# Patient Record
Sex: FEMALE | Race: WHITE | Employment: UNEMPLOYED | URBAN - METROPOLITAN AREA
[De-identification: names, ages, dates, MRNs, and addresses within clinical notes are randomized per-mention and may not be internally consistent; named-entity substitution may affect disease eponyms.]

---

## 2017-06-04 ENCOUNTER — HOSPITAL ENCOUNTER (EMERGENCY)
Age: 42
Discharge: STILL ADMITTED OR ADMINISTRATIVE | End: 2017-06-04
Attending: FAMILY MEDICINE

## 2018-02-24 ENCOUNTER — HOSPITAL ENCOUNTER (EMERGENCY)
Age: 43
Discharge: HOME | End: 2018-02-24
Attending: EMERGENCY MEDICINE | Admitting: EMERGENCY MEDICINE
Payer: SELF-PAY

## 2018-02-24 ENCOUNTER — APPOINTMENT (OUTPATIENT)
Dept: CT IMAGING | Age: 43
End: 2018-02-24
Attending: EMERGENCY MEDICINE
Payer: SELF-PAY

## 2018-02-24 VITALS
OXYGEN SATURATION: 96 % | SYSTOLIC BLOOD PRESSURE: 102 MMHG | WEIGHT: 110 LBS | HEIGHT: 65 IN | RESPIRATION RATE: 16 BRPM | BODY MASS INDEX: 18.33 KG/M2 | TEMPERATURE: 98.8 F | HEART RATE: 75 BPM | DIASTOLIC BLOOD PRESSURE: 61 MMHG

## 2018-02-24 DIAGNOSIS — R51.9 NONINTRACTABLE EPISODIC HEADACHE, UNSPECIFIED HEADACHE TYPE: Primary | ICD-10-CM

## 2018-02-24 PROCEDURE — 96374 THER/PROPH/DIAG INJ IV PUSH: CPT

## 2018-02-24 PROCEDURE — 70450 CT HEAD/BRAIN W/O DYE: CPT

## 2018-02-24 PROCEDURE — 74011250637 HC RX REV CODE- 250/637: Performed by: EMERGENCY MEDICINE

## 2018-02-24 PROCEDURE — 96361 HYDRATE IV INFUSION ADD-ON: CPT

## 2018-02-24 PROCEDURE — 99283 EMERGENCY DEPT VISIT LOW MDM: CPT

## 2018-02-24 PROCEDURE — 74011250636 HC RX REV CODE- 250/636: Performed by: EMERGENCY MEDICINE

## 2018-02-24 PROCEDURE — 96375 TX/PRO/DX INJ NEW DRUG ADDON: CPT

## 2018-02-24 RX ORDER — BUTALBITAL, ACETAMINOPHEN AND CAFFEINE 50; 325; 40 MG/1; MG/1; MG/1
1 TABLET ORAL
Status: COMPLETED | OUTPATIENT
Start: 2018-02-24 | End: 2018-02-24

## 2018-02-24 RX ORDER — BUTALBITAL, ACETAMINOPHEN AND CAFFEINE 300; 40; 50 MG/1; MG/1; MG/1
1 CAPSULE ORAL
Qty: 15 CAP | Refills: 0 | Status: SHIPPED | OUTPATIENT
Start: 2018-02-24 | End: 2018-07-17

## 2018-02-24 RX ORDER — SODIUM CHLORIDE 9 MG/ML
1000 INJECTION, SOLUTION INTRAVENOUS ONCE
Status: COMPLETED | OUTPATIENT
Start: 2018-02-24 | End: 2018-02-24

## 2018-02-24 RX ORDER — METOCLOPRAMIDE HYDROCHLORIDE 5 MG/ML
10 INJECTION INTRAMUSCULAR; INTRAVENOUS
Status: COMPLETED | OUTPATIENT
Start: 2018-02-24 | End: 2018-02-24

## 2018-02-24 RX ORDER — DIPHENHYDRAMINE HYDROCHLORIDE 50 MG/ML
25 INJECTION, SOLUTION INTRAMUSCULAR; INTRAVENOUS
Status: COMPLETED | OUTPATIENT
Start: 2018-02-24 | End: 2018-02-24

## 2018-02-24 RX ORDER — DIPHENHYDRAMINE HYDROCHLORIDE 50 MG/ML
25 INJECTION, SOLUTION INTRAMUSCULAR; INTRAVENOUS
Status: DISCONTINUED | OUTPATIENT
Start: 2018-02-24 | End: 2018-02-24

## 2018-02-24 RX ADMIN — BUTALBITAL, ACETAMINOPHEN AND CAFFEINE 1 TABLET: 50; 325; 40 TABLET ORAL at 11:33

## 2018-02-24 RX ADMIN — SODIUM CHLORIDE 1000 ML: 900 INJECTION, SOLUTION INTRAVENOUS at 09:55

## 2018-02-24 RX ADMIN — METOCLOPRAMIDE 10 MG: 5 INJECTION, SOLUTION INTRAMUSCULAR; INTRAVENOUS at 09:53

## 2018-02-24 RX ADMIN — DIPHENHYDRAMINE HYDROCHLORIDE 25 MG: 50 INJECTION, SOLUTION INTRAMUSCULAR; INTRAVENOUS at 10:04

## 2018-02-24 NOTE — ED PROVIDER NOTES
EMERGENCY DEPARTMENT HISTORY AND PHYSICAL EXAM      Date: 2/24/2018  Patient Name: Ketty Whitfield    History of Presenting Illness     Chief Complaint   Patient presents with    Headache     since thursday; localized to anterior/right/posterior head. pt had a gum infection 2 weeks ago on the right side which morphed into this headache    Nausea    Vomiting     one episode today       History Provided By: Patient    HPI: Ketty Whitfield, 43 y.o. female with PMHx significant for HA, depression, and anxiety, presents herself w/ daughter to the ED with cc of progressively worsening HA for the last 3 days. Pt reports associated photophobia, nausea, and decreased appetite since onset of HA. She notes that she has been experiencing this HA soon after her toothache has steadily decreased. She reports exacerbation of HA w/ light and talking. She notes slight improvement of HA w/ lying down. Pt states she took a variety of OTC medicine (Motrin, Tylenol, Advil) at home for improvement of symptoms but found no such relief. She reports of experiencing similar symptoms prior to arrival to ED. Pt additionally reports that her menstrual cycles are irregular as she had a tubal ligation 10 years ago. She notes of a hx of ovarian cysts. Pt denies any recent long distance travel. Pt denies any recent illness. Pt specifically denies vomiting. PCP: None    There are no other complaints, changes, or physical findings at this time.     Past History     Past Medical History:  Past Medical History:   Diagnosis Date    Anxiety     Depression     Gyn exam     Dr Katie Lr Headache(784.0)     Panic attack     Postpartum hemorrhage 1/2010    Psychiatric disorder     depression    Sleep trouble     Tobacco use disorder        Past Surgical History:  Past Surgical History:   Procedure Laterality Date    DILATION AND CURETTAGE  2003, 1/2010    HX GYN      D+C x 2    HX HEENT      tonsillectomy    HX PACEMAKER  09/2013    HX TUBAL LIGATION  4-2010       Family History:  Family History   Problem Relation Age of Onset    Cancer Maternal Grandmother     Diabetes Maternal Grandmother     Heart Disease Maternal Grandmother     Hypertension Maternal Grandmother     Hypertension Mother        Social History:  Social History   Substance Use Topics    Smoking status: Current Every Day Smoker     Packs/day: 0.50     Years: 10.00     Types: Cigarettes    Smokeless tobacco: Never Used    Alcohol use No      Comment: patient states she quit       Allergies:  No Known Allergies      Review of Systems   Review of Systems   Constitutional: Positive for appetite change (decreased). Negative for chills and fever. HENT: Negative for congestion. Eyes: Positive for photophobia. Negative for visual disturbance. Respiratory: Negative for chest tightness. Cardiovascular: Negative for chest pain and leg swelling. Gastrointestinal: Positive for nausea. Negative for abdominal pain and vomiting. Endocrine: Negative for polyuria. Genitourinary: Negative for dysuria and frequency. Musculoskeletal: Negative for myalgias. Skin: Negative for color change. Allergic/Immunologic: Negative for immunocompromised state. Neurological: Positive for headaches. Negative for numbness. Physical Exam   Physical Exam    Nursing note and vitals reviewed. General appearance: non-toxic,   Eyes: PERRL, EOMI, conjunctiva normal, anicteric sclera  HEENT: mucous membranes tacky, oropharynx is clear, neck is supple, upper and lower dentures in place. Tongue piercing noted. No gingival swelling, drainage, or signs of ongoing infection.    Pulmonary: clear to auscultation bilaterally  Cardiac: normal rate and regular rhythm, no murmurs, gallops, or rubs, 2+ peripheral pulses, no carotid bruits, cap refill < 2 seconds  Abdomen: soft, nontender, nondistended, bowel sounds present  MSK: no lower extremity edema  Neuro: Alert, answers questions appropriately, 5/5 strength in all 4 extremities, VFF, finger to nose normal, light touch intact in all four extremities, cranial nerves II-XII intact    Diagnostic Study Results     Radiologic Studies -   CT Results  (Last 48 hours)               02/24/18 1027  CT HEAD WO CONT Final result    Impression:  IMPRESSION:    1. No evidence of acute intracranial abnormality by this modality. Narrative:  EXAM:  CT HEAD WO CONT   INDICATION:   Headache   Additional history: Localize headache and anterior, right and posterior head. Previous dental/oral infection the right side previously which the patient   believes is related. COMPARISON: None. .   TECHNIQUE:    Unenhanced CT of the head was performed using 5 mm images. Coronal and sagittal   reformats were produced. Brain and bone windows were generated. CT dose reduction was achieved through use of a standardized protocol tailored   for this examination and automatic exposure control for dose modulation. Nancy Contreras FINDINGS:   The ventricles and sulci are normal in size, shape and configuration and   midline. There is no significant white matter disease. There is no intracranial   hemorrhage, extra-axial collection, mass, mass effect or midline shift. The   basilar cisterns are open. No acute infarct is identified. The bone windows   demonstrate no abnormalities. The visualized portions of the paranasal sinuses   and mastoid air cells are clear. .           Medical Decision Making   I am the first provider for this patient. I reviewed the vital signs, available nursing notes, past medical history, past surgical history, family history and social history. Vital Signs-Reviewed the patient's vital signs.   Patient Vitals for the past 12 hrs:   Temp Pulse Resp BP SpO2   02/24/18 0901 98.8 °F (37.1 °C) 75 16 102/61 96 %     Records Reviewed: Nursing Notes and Old Medical Records    Provider Notes (Medical Decision Making):   DDx: migraine, tension HA, hormone related HA, cephalgia, low suspicion for [facility] or cns infection. ED Course:   Initial assessment performed. The patients presenting problems have been discussed, and they are in agreement with the care plan formulated and outlined with them. I have encouraged them to ask questions as they arise throughout their visit. PROGRESS NOTE:   10:50 AM  Pt has been re-examined by Adeline Mooney MD. Pt states she feels better after receiving medicine. 11:10 AM  Pt has been updated on available imaging results by Adeline Mooney MD. Discussed plan of care and possible discharge. Pt agrees to plan of care and is ready to be discharged. Discharge Note:  11:41 AM  The pt is ready for discharge. The pt's signs, symptoms, diagnosis, and discharge instructions have been discussed and pt has conveyed their understanding. The pt is to follow up as recommended or return to ER should their symptoms worsen. Plan has been discussed and pt is in agreement. PLAN:  1. Current Discharge Medication List      START taking these medications    Details   butalbital-acetaminophen-caff (FIORICET) -40 mg per capsule Take 1 Cap by mouth every six (6) hours as needed for Pain or Headache. Indications: TENSION-TYPE HEADACHE, DO NOT COMBINE WITH OTHER TYLENOL CONTAINING PRODUCTS  Qty: 15 Cap, Refills: 0           2. Follow-up Information     Follow up With Details Comments Contact Info    PRIMARY CARE DOCTOR Schedule an appointment as soon as possible for a visit in 1 week  SEE ATTACHED LIST    [facility] EMERGENCY DEPT Go in 1 day If symptoms worsen [address] Ayaan Michel MD Schedule an appointment as soon as possible for a visit in 1 week [address]  [facility] [address] [place] [address]  [phone]          Return to ED if worse     Diagnosis     Clinical Impression:   1.  Nonintractable episodic headache, unspecified headache type        Attestations: This note is prepared by [employer], acting as Scribe for MD Forrest Ely MD: The scribe's documentation has been prepared under my direction and personally reviewed by me in its entirety. I confirm that the note above accurately reflects all work, treatment, procedures, and medical decision making performed by me.

## 2018-02-24 NOTE — DISCHARGE INSTRUCTIONS
Headache: Care Instructions  Your Care Instructions    Headaches have many possible causes. Most headaches aren't a sign of a more serious problem, and they will get better on their own. Home treatment may help you feel better faster. The doctor has checked you carefully, but problems can develop later. If you notice any problems or new symptoms, get medical treatment right away. Follow-up care is a key part of your treatment and safety. Be sure to make and go to all appointments, and call your doctor if you are having problems. It's also a good idea to know your test results and keep a list of the medicines you take. How can you care for yourself at home? · Do not drive if you have taken a prescription pain medicine. · Rest in a quiet, dark room until your headache is gone. Close your eyes and try to relax or go to sleep. Don't watch TV or read. · Put a cold, moist cloth or cold pack on the painful area for 10 to 20 minutes at a time. Put a thin cloth between the cold pack and your skin. · Use a warm, moist towel or a heating pad set on low to relax tight shoulder and neck muscles. · Have someone gently massage your neck and shoulders. · Take pain medicines exactly as directed. ¨ If the doctor gave you a prescription medicine for pain, take it as prescribed. ¨ If you are not taking a prescription pain medicine, ask your doctor if you can take an over-the-counter medicine. · Be careful not to take pain medicine more often than the instructions allow, because you may get worse or more frequent headaches when the medicine wears off. · Do not ignore new symptoms that occur with a headache, such as a fever, weakness or numbness, vision changes, or confusion. These may be signs of a more serious problem. To prevent headaches  · Keep a headache diary so you can figure out what triggers your headaches. Avoiding triggers may help you prevent headaches.  Record when each headache began, how long it lasted, and what the pain was like (throbbing, aching, stabbing, or dull). Write down any other symptoms you had with the headache, such as nausea, flashing lights or dark spots, or sensitivity to bright light or loud noise. Note if the headache occurred near your period. List anything that might have triggered the headache, such as certain foods (chocolate, cheese, wine) or odors, smoke, bright light, stress, or lack of sleep. · Find healthy ways to deal with stress. Headaches are most common during or right after stressful times. Take time to relax before and after you do something that has caused a headache in the past.  · Try to keep your muscles relaxed by keeping good posture. Check your jaw, face, neck, and shoulder muscles for tension, and try relaxing them. When sitting at a desk, change positions often, and stretch for 30 seconds each hour. · Get plenty of sleep and exercise. · Eat regularly and well. Long periods without food can trigger a headache. · Treat yourself to a massage. Some people find that regular massages are very helpful in relieving tension. · Limit caffeine by not drinking too much coffee, tea, or soda. But don't quit caffeine suddenly, because that can also give you headaches. · Reduce eyestrain from computers by blinking frequently and looking away from the computer screen every so often. Make sure you have proper eyewear and that your monitor is set up properly, about an arm's length away. · Seek help if you have depression or anxiety. Your headaches may be linked to these conditions. Treatment can both prevent headaches and help with symptoms of anxiety or depression. When should you call for help? Call 911 anytime you think you may need emergency care. For example, call if:  ? · You have signs of a stroke. These may include:  ¨ Sudden numbness, paralysis, or weakness in your face, arm, or leg, especially on only one side of your body. ¨ Sudden vision changes.   ¨ Sudden trouble speaking. ¨ Sudden confusion or trouble understanding simple statements. ¨ Sudden problems with walking or balance. ¨ A sudden, severe headache that is different from past headaches. ?Call your doctor now or seek immediate medical care if:  ? · You have a new or worse headache. ? · Your headache gets much worse. Where can you learn more? Go to http://will-randi.info/. Enter M271 in the search box to learn more about \"Headache: Care Instructions. \"  Current as of: October 14, 2016  Content Version: 11.4  © 9488-0780 [employer]. Care instructions adapted under license by [employer] (which disclaims liability or warranty for this information). If you have questions about a medical condition or this instruction, always ask your healthcare professional. [address] any warranty or liability for your use of this information.    ==============================================================    [facility] SYSTEMS Departments     For adult and child immunizations, family planning, TB screening, STD testing and women's health services. [facility]: [place] [phone]      [facility] 25   657 [facility]   [address]   170 [facility]: [address] [phone] 2400 [facility]          [address] [address]     For primary care services, woman and child wellness, and some clinics providing specialty care. [facility] -- 1011 [facility]. [address] [place] [address] [phone]   [address] [place] [address] [address] [facility] [address] 3614 [facility] [phone]   [address] [facility] [address] [phone]   [address] [facility] [place] [address] [place] [address]  [phone]   [facility]  [address] [phone]   [facility] [address] [phone]   Annmarie Ballesteros [facility] [address]   [facility]: [facility] [address] [facility] [address] [phone], ext Nikolay 79 [facility] [phone]     [facility] [address] [phone]   [address] [phone]   [employer]  [address], [address] ([email or web address]/about/mission. asp) [phone]         Sexual Health/Woman Wellness Clinics    For STD/HIV testing and treatment, pregnancy testing and services, men's health, birth control services, LGBT services, and hepatitis/HPV vaccine services. [facility] [place] [employer] [address] [facility] 75 [facility] 1579 [address] [phone]   [facility] [address], 5th floor [phone]   Pregnancy [address] for Women [address]  [facility] [phone]         [place] [address] [facility] [address] [place] [address]   [phone]   [place]   [phone]   Women, Infant and Children's Services: [address] [phone]       [address] [phone]   [facility] Crisis Intervention   [phone]   [facility]   [phone]   [facility]     [phone]   [address] Crisis   1212 [facility] [phone]     Local Primary Care Physicians  64 [facility] [phone]  MD German Parra MD Gustabo Balboa, MD [facility] [phone]  EVITA Anglin MD Deliliah Mins, MD Tally Roch, MD [address] [address] [phone]  [place] [address] [facility] [phone]  MD Ila Jimenez MD Clifford Montes, MD Jari Cambric, MD [facility] [phone]  SZJR QRCIMZ YAW, MD Jewell Moyer, MD Sree Elizondo, NP [facility] [phone]  Norvell Kanner, MD Genaro Guzman, MD Tomas Hector, MD Adela Goncalves, MD Nataly Osuna, MD Isaac Woods, MD Karie Barr, MD   33 19 [facility]  Galo Vicente, MD [facility] [phone]  Jamila Chamorro, MD Kerry Guardado, NP  Jocelyn Rhodes, MD Jeanne Rollins, MD Leocadia Lundborg, MD Shaheen Donohue, MD Gavin Castellanos, MD   2780 [facility] [phone]  Estrella Ashton, MD Richard Maravilla, [facility]  Antonette Gomez, NP  Chika Mackenzie, MD Kari Brito, MD Annalisa Diaz, MD Davis Corley, MD GILBERT [facility] [phone]  Kelin Santos, MD Jovanny Tran, MD Sangita Lincoln, MD Kathy Onofre, MD Rodolfo Mahmood, MD   [address] [phone]  Magi Veliz, MD Jeffrey De La Torre, MD [facility] [phone]  Milady Quiñones, MD Noris Christopher, MD Renetta Miller MD   [facility] Physicians [phone]  Michelle Galan, MD Lanre Hawthorne, MD Mortimer Fall, MD Magdaleno Wagoner, MD Jose Landin, MD Eleni Madrid, NP  Mihai Bearden MD [address]  66   [phone]  MD Luther Reyes, MD Leobardo Mcclure MD   2102 [facility] [phone]  Edilson Mancera, MD Simone Ghotra, FNP  Aubrey Wallace, PALEANN Wallace, FNP  Karina Narvaez, CHAY Husain, MD Cash Ewing, NP   Walter Baron, DO Miscellaneous:  Rangel Thomas [phone]

## 2018-02-24 NOTE — ED NOTES
Dr Baljit Alan reviewed discharge instructions with the patient. The patient verbalized understanding. All questions and concerns were addressed. The patient declined a wheelchair and is discharged ambulatory in the care of family members with instructions and prescriptions in hand. Pt is alert and oriented x 4. Respirations are clear and unlabored.

## 2018-07-17 ENCOUNTER — HOSPITAL ENCOUNTER (EMERGENCY)
Age: 43
Discharge: HOME | End: 2018-07-17
Attending: EMERGENCY MEDICINE
Payer: SELF-PAY

## 2018-07-17 VITALS
DIASTOLIC BLOOD PRESSURE: 78 MMHG | HEIGHT: 65 IN | RESPIRATION RATE: 16 BRPM | OXYGEN SATURATION: 100 % | HEART RATE: 72 BPM | WEIGHT: 105.38 LBS | SYSTOLIC BLOOD PRESSURE: 120 MMHG | TEMPERATURE: 98.2 F | BODY MASS INDEX: 17.56 KG/M2

## 2018-07-17 DIAGNOSIS — G50.1 ATYPICAL FACIAL PAIN: ICD-10-CM

## 2018-07-17 DIAGNOSIS — K08.89 DENTALGIA: Primary | ICD-10-CM

## 2018-07-17 DIAGNOSIS — Z71.6 TOBACCO ABUSE COUNSELING: ICD-10-CM

## 2018-07-17 PROCEDURE — 99282 EMERGENCY DEPT VISIT SF MDM: CPT

## 2018-07-17 RX ORDER — PENICILLIN V POTASSIUM 500 MG/1
500 TABLET, FILM COATED ORAL 4 TIMES DAILY
Qty: 28 TAB | Refills: 0 | Status: SHIPPED | OUTPATIENT
Start: 2018-07-17 | End: 2018-07-24

## 2018-07-17 RX ORDER — LIDOCAINE HYDROCHLORIDE 20 MG/ML
JELLY TOPICAL
Qty: 6 ML | Refills: 0 | Status: SHIPPED | OUTPATIENT
Start: 2018-07-17

## 2018-07-17 RX ORDER — TRAMADOL HYDROCHLORIDE 50 MG/1
50 TABLET ORAL
Qty: 10 TAB | Refills: 0 | Status: SHIPPED | OUTPATIENT
Start: 2018-07-17

## 2018-07-17 NOTE — LETTER
[facility] EMERGENCY DEPT 
[address] [place] [address] [address] [id number] [phone] Work/School Note Date: 7/17/2018 To Whom It May concern: 
 
Alisson Tong was seen and treated today in the emergency room by the following provider(s): 
Attending Provider: Danielle Rose MD 
Physician Assistant: Mi Hamm. Bret Leblanc. Alisson Tong may return to work on 07/19/2018. Sincerely, 
 
 
 
 
Mi Hamm.  Bret Leblanc

## 2018-07-17 NOTE — ED PROVIDER NOTES
EMERGENCY DEPARTMENT HISTORY AND PHYSICAL EXAM 
 
 
Date: 7/17/2018 Patient Name: James Alexander History of Presenting Illness Chief Complaint Patient presents with  Dental Pain  
  right lower dental pain x3 days History Provided By: Patient HPI: James Alexander, 43 y.o. female with PMHx significant for anxiety, depression, presents ambulatory to the ED with cc of right lower dental pain x 3 days. Timing:  Gradual and Progressive Location: Right lower Quality: Aching Severity: 8 out of 10 Modifying Factors: Pt states that her pain worsens when she eats or drinks something. Her pain improves slightly with tylenol. Associated Symptoms: denies any other associated signs or symptoms PCP: None There are no other complaints, changes, or physical findings at this time. Current Outpatient Prescriptions Medication Sig Dispense Refill  penicillin v potassium (VEETID) 500 mg tablet Take 1 Tab by mouth four (4) times daily for 7 days. 28 Tab 0  
 lidocaine (URO-JET) 2 % jelp jelly Apply to affected area three times daily as needed. 6 mL 0  
 traMADol (ULTRAM) 50 mg tablet Take 1 Tab by mouth every six (6) hours as needed for Pain. Max Daily Amount: 200 mg. Indications: Pain 10 Tab 0 Past History Past Medical History: 
Past Medical History:  
Diagnosis Date  Anxiety  Depression  Gyn exam   
 Dr Jocelyne Ace  Headache(784.0)  Panic attack  Postpartum hemorrhage 1/2010  Psychiatric disorder   
 depression  Sleep trouble  Tobacco use disorder Past Surgical History: 
Past Surgical History:  
Procedure Laterality Date [address] [facility]  2003, 1/2010  HX GYN    
 D+C x 2  
 HX HEENT    
 tonsillectomy  HX PACEMAKER  09/2013  HX TUBAL LIGATION  4-2010 Family History: 
Family History Problem Relation Age of Onset  Cancer Maternal Grandmother  Diabetes Maternal Grandmother  Heart Disease Maternal Grandmother  Hypertension Maternal Grandmother  Hypertension Mother Social History: 
Social History Substance Use Topics  Smoking status: Current Every Day Smoker Packs/day: 0.50 Years: 10.00 Types: Cigarettes  Smokeless tobacco: Never Used  Alcohol use No  
   Comment: patient states she quit Allergies: 
No Known Allergies Review of Systems Review of Systems Physical Exam  
Physical Exam  
Constitutional: She is oriented to person, place, and time. Vital signs are normal. She appears well-developed and well-nourished. No distress. 43 y.o.  female in NAD Communicates appropriately and in full sentences HENT:  
Head: Normocephalic and atraumatic. Generally poor dental health overall with multiple dental caries. Multiple missing or decayed teeth. No facial erythema, edema, gingival tenderness, bleeding, or fluctuance. Tooth #27 with dental caries, no surrounding erythema, edema, or fluctuance. No trismus. Eyes: Conjunctivae are normal. Pupils are equal, round, and reactive to light. Right eye exhibits no discharge. Left eye exhibits no discharge. Neck: Normal range of motion. Neck supple. No nuchal rigidity or meningeal signs Pulmonary/Chest: Effort normal. No respiratory distress. Musculoskeletal: Normal range of motion. She exhibits no deformity. No neurologic, motor, vascular, or compartment embarrassment observed on exam. No focal neurologic deficits. Neurological: She is alert and oriented to person, place, and time. Skin: Skin is warm and dry. She is not diaphoretic. Psychiatric: She has a normal mood and affect. Her behavior is normal.  
Nursing note and vitals reviewed. Diagnostic Study Results No formal testing initiated. Medical Decision Making I am the first provider for this patient.  
 
I reviewed the vital signs, available nursing notes, past medical history, past surgical history, family history and social history. Vital Signs-Reviewed the patient's vital signs. Patient Vitals for the past 12 hrs: 
 Temp Pulse Resp BP SpO2  
07/17/18 1554 98.2 °F (36.8 °C) 72 16 120/78 100 % Records Reviewed: Nursing Notes and Old Medical Records Provider Notes (Medical Decision Making): DDx: sialadenitis, dental caries, fractured tooth, poor compliance with regular dental follow-up, lack of dental insurance, trigeminal neuralgia, retropharyngeal abscess, Jeane Arely, tobacco abuse Patient presents with dental pain without facial swelling. No obvious abscess that needs drainage. No red flags that make PTA, RPA, ludwigs angina concerning. Pt's vital signs have been stable while in the ED [facility] ED. Will tx with cetacaine/benadryl/lidocaine cotton balls, antibiotics and outpatient analgesics. Given information on dentists and importance of followup and no smoking. Pt expresses that they will be compliant with the discussed plan. ED Course:  
Initial assessment performed. The patients presenting problems have been discussed, and they are in agreement with the care plan formulated and outlined with them. I have encouraged them to ask questions as they arise throughout their visit. TOBACCO COUNSELING: 
Upon evaluation, pt expressed that they are a current tobacco user. Pt has been counseled on the dangers of smoking and was encouraged to quit as soon as possible in order to decrease further risks to their health. Pt has conveyed their understanding of the risks involved should they continue to use tobacco products. Critical Care Time:  
0 DISCHARGE NOTE: 
Declan Trejo Lupe's  results have been reviewed with her. She has been counseled regarding her diagnosis. She verbally conveys understanding and agreement of the signs, symptoms, diagnosis, treatment and prognosis and additionally agrees to follow up as recommended with Dr. None in 24 - 48 hours.   She also agrees with the care-plan and conveys that all of her questions have been answered. I have also put together some discharge instructions for her that include: 1) educational information regarding their diagnosis, 2) how to care for their diagnosis at home, as well a 3) list of reasons why they would want to return to the ED prior to their follow-up appointment, should their condition change. She and/or family's questions have been answered. I have encouraged them to see the official results in [facility]\" or to retrieve the specifics of their results from medical records. PLAN: 
1. Return precautions as discussed 2. Follow-up with providers as directed 3. Medications as prescribed Return to ED if worse Diagnosis Clinical Impression: 1. Glenda Slocumb 2. Atypical facial pain 3. Tobacco abuse counseling Discharge Medication List as of 7/17/2018  4:13 PM  
  
START taking these medications Details  
penicillin v potassium (VEETID) 500 mg tablet Take 1 Tab by mouth four (4) times daily for 7 days. , Normal, Disp-28 Tab, R-0  
  
lidocaine (URO-JET) 2 % jelp jelly Apply to affected area three times daily as needed., Normal, Disp-6 mL, R-0  
  
traMADol (ULTRAM) 50 mg tablet Take 1 Tab by mouth every six (6) hours as needed for Pain. Max Daily Amount: 200 mg. Indications: Pain, Print, Disp-10 Tab, R-0 Follow-up Information Follow up With Details Comments Contact Info None Schedule an appointment as soon as possible for a visit in 2 days As needed, If symptoms worsen, Possible further evaluation and treatment None [phone] Patient stated that they have no PCP [facility] Go to As needed, If symptoms worsen [address] [facility] 
[phone] [facility]   [address] [facility] [address] 
[phone] This note will not be viewable in [address]

## 2018-07-17 NOTE — DISCHARGE INSTRUCTIONS
Head or Face Pain: Care Instructions  Your Care Instructions    Common causes of head or face pain are allergies, stress, and injuries. Other causes include tooth problems and sinus infections. Eating certain foods, such as chocolate or cheese, or drinking certain liquids, such as coffee or cola, can cause head pain for some people. If you have mild head pain, you may not need treatment. It is important to watch your symptoms and talk to your doctor if your pain continues or gets worse. Follow-up care is a key part of your treatment and safety. Be sure to make and go to all appointments, and call your doctor if you are having problems. It's also a good idea to know your test results and keep a list of the medicines you take. How can you care for yourself at home? · Take pain medicines exactly as directed. ¨ If the doctor gave you a prescription medicine for pain, take it as prescribed. ¨ If you are not taking a prescription pain medicine, ask your doctor if you can take an over-the-counter pain medicine. · Take it easy for the next few days or longer if you are not feeling well. · Use a warm, moist towel or heating pad set on low to relax tight muscles in your shoulder and neck. Have someone gently massage your neck and shoulders. · Put ice or a cold pack on the area for 10 to 20 minutes at a time. Put a thin cloth between the ice and your skin. When should you call for help? Call 911 anytime you think you may need emergency care. For example, call if:    · You have twitching, jerking, or a seizure.     · You passed out (lost consciousness).     · You have symptoms of a stroke. These may include:  ¨ Sudden numbness, tingling, weakness, or loss of movement in your face, arm, or leg, especially on only one side of your body. ¨ Sudden vision changes. ¨ Sudden trouble speaking. ¨ Sudden confusion or trouble understanding simple statements. ¨ Sudden problems with walking or balance.   ¨ A sudden, severe headache that is different from past headaches.     · You have jaw pain and pain in your chest, shoulder, neck, or arm.    Call your doctor now or seek immediate medical care if:    · You have a fever with a stiff neck or a severe headache.     · You have nausea and vomiting, or you cannot keep food or liquids down.    Watch closely for changes in your health, and be sure to contact your doctor if:    · Your head or face pain does not get better as expected. Where can you learn more? Go to http://will-randi.info/. Enter P568 in the search box to learn more about \"Head or Face Pain: Care Instructions. \"  Current as of: November 20, 2017  Content Version: 11.7  © 9494-0134 [employer]. Care instructions adapted under license by [employer] (which disclaims liability or warranty for this information). If you have questions about a medical condition or this instruction, always ask your healthcare professional. [place] [address] any warranty or liability for your use of this information. Periodontal Conditions: Care Instructions  Your Care Instructions    Periodontal conditions affect the gums, bone, and tissue that surround and support the teeth. The most common problems are caused by plaque. Plaque is a thin film of bacteria that sticks to teeth above and below the gum line. It can build up and harden into tartar. The bacteria in plaque and tartar can cause gum disease. Gingivitis is a disease that affects the gums (gingiva). The gums are the soft tissue that surrounds the teeth. Gingivitis causes red, swollen, tender gums that bleed easily when brushed, persistent bad breath, and sensitive teeth. Because it is not painful, many people do not get treatment when they should. Gingivitis can be reversed with good dental care. Periodontitis is a more advanced disease that affects more than the gums. The gums pull away from the teeth.  This leaves deep pockets where bacteria can grow. The disease can damage the bones that support the teeth. The teeth may get loose and fall out. A periodontal condition should be treated as soon as it is found. Finding gum problems early, treating them right away, and having regular checkups bring the best results. You can treat mild periodontal conditions by brushing and flossing your teeth every day. Your dentist may prescribe a mouthwash to kill the bacteria that can damage teeth and gums. Your dentist may have you take antibiotics to treat infection from moderate periodontal disease. If your gums have pulled away from your teeth, you may need cleaning between the teeth and gums right down to the teeth roots. This is called root planing and scaling. If you have severe periodontal disease, you may need surgery to remove diseased gum tissue or repair bone damage. Follow-up care is a key part of your treatment and safety. Be sure to make and go to all appointments, and call your dentist if you are having problems. It's also a good idea to know your test results and keep a list of the medicines you take. How can you care for yourself at home? · If your dentist prescribed antibiotics, take them as directed. Do not stop taking them just because you feel better. You need to take the full course of antibiotics. · Brush your teeth twice a day, in the morning and at night. ¨ Use a toothbrush with soft, rounded-end bristles and a head that is small enough to reach all parts of your teeth and mouth. Replace your toothbrush every 3 to 4 months. ¨ Use a fluoride toothpaste. ¨ Place the brush at a 45-degree angle where the teeth meet the gums. Press firmly, and gently rock the brush back and forth using small circular movements. ¨ Brush chewing surfaces vigorously with short back-and-forth strokes. ¨ Brush your tongue from back to front. · Floss at least once a day.  Choose the type and flavor that you like best.  · Have your teeth cleaned by a professional at least twice a year. · Ask your dentist about using an antibacterial mouthwash to help reduce bacteria. · Rinse your mouth with water or chew sugar-free gum after meals if you can't brush your teeth. · Do not smoke or use smokeless tobacco. Tobacco use can cause periodontal disease. When should you call for help? Call your dentist now or seek immediate medical care if:    · You have symptoms of infection, such as:  ¨ Increased pain, swelling, warmth, or redness. ¨ Red streaks leading from the area. ¨ Pus draining from the area. ¨ A fever.    Watch closely for changes in your health, and be sure to contact your dentist if:    · You have new or worse tooth pain.     · You do not get better as expected. Where can you learn more? Go to http://will-randi.info/. Enter U620 in the search box to learn more about \"Periodontal Conditions: Care Instructions. \"  Current as of: May 12, 2017  Content Version: 11.7  © 6558-8828 [employer]. Care instructions adapted under license by [employer] (which disclaims liability or warranty for this information). If you have questions about a medical condition or this instruction, always ask your healthcare professional. [address] any warranty or liability for your use of this information. Learning About Benefits From Quitting Smoking  How does quitting smoking make you healthier? If you're thinking about quitting smoking, you may have a few reasons to be smoke-free. Your health may be one of them. · When you quit smoking, you lower your risks for cancer, lung disease, heart attack, stroke, blood vessel disease, and blindness from macular degeneration. · When you're smoke-free, you get sick less often, and you heal faster. You are less likely to get colds, flu, bronchitis, and pneumonia.   · As a nonsmoker, you may find that your mood is better and you are less stressed. When and how will you feel healthier? Quitting has real health benefits that start from day 1 of being smoke-free. And the longer you stay smoke-free, the healthier you get and the better you feel. The first hours  · After just 20 minutes, your blood pressure and heart rate go down. That means there's less stress on your heart and blood vessels. · Within 12 hours, the level of carbon monoxide in your blood drops back to normal. That makes room for more oxygen. With more oxygen in your body, you may notice that you have more energy than when you smoked. After 2 weeks  · Your lungs start to work better. · Your risk of heart attack starts to drop. After 1 month  · When your lungs are clear, you cough less and breathe deeper, so it's easier to be active. · Your sense of taste and smell return. That means you can enjoy food more than you have since you started smoking. Over the years  · After 1 year, your risk of heart disease is half what it would be if you kept smoking. · After 5 years, your risk of stroke starts to shrink. Within a few years after that, it's about the same as if you'd never smoked. · After 10 years, your risk of dying from lung cancer is cut by about half. And your risk for many other types of cancer is lower too. How would quitting help others in your life? When you quit smoking, you improve the health of everyone who now breathes in your smoke. · Their heart, lung, and cancer risks drop, much like yours. · They are sick less. For babies and small children, living smoke-free means they're less likely to have ear infections, pneumonia, and bronchitis. · If you're a woman who is or will be pregnant someday, quitting smoking means a healthier . · Children who are close to you are less likely to become adult smokers. Where can you learn more? Go to http://will-randi.info/.   Enter 458 [id number] 72 30 in the search box to learn more about \"Learning About Benefits From Quitting Smoking. \"  Current as of: November 29, 2017  Content Version: 11.7  © 9393-2529 [employer]. Care instructions adapted under license by [employer] (which disclaims liability or warranty for this information). If you have questions about a medical condition or this instruction, always ask your healthcare professional. [address] any warranty or liability for your use of this information. Tooth and Gum Pain: Care Instructions  Your Care Instructions    The most common causes of dental pain are tooth decay and gum disease. Pain can also be caused by an infection of the tooth (abscess) or the gums. Or you may have pain from a broken or cracked tooth. Other causes of pain include infection and damage to a tooth from nervous grinding of your teeth. A wisdom tooth can be painful when it is coming in but cannot break through the gum. It can also be painful when the tooth is only partway in and extra gum tissue has formed around it. The tissue can get inflamed (pericoronitis), and sometimes it gets infected. Prompt dental care can help find the cause of your toothache and keep the tooth from dying or gum disease from getting worse. Self-care at home may reduce your pain and discomfort. Follow-up care is a key part of your treatment and safety. Be sure to make and go to all appointments, and call your dentist or doctor if you are having problems. It's also a good idea to know your test results and keep a list of the medicines you take. How can you care for yourself at home? · To reduce pain and facial swelling, put an ice or cold pack on the outside of your cheek for 10 to 20 minutes at a time. Put a thin cloth between the ice and your skin. Do not use heat. · If your doctor prescribed antibiotics, take them as directed. Do not stop taking them just because you feel better.  You need to take the full course of antibiotics. · Ask your doctor if you can take an over-the-counter pain medicine, such as acetaminophen (Tylenol), ibuprofen (Advil, Motrin), or naproxen (Aleve). Be safe with medicines. Read and follow all instructions on the label. · Avoid very hot, cold, or sweet foods and drinks if they increase your pain. · Rinse your mouth with warm salt water every 2 hours to help relieve pain and swelling. Mix 1 teaspoon of salt in 8 ounces of water. · Talk to your dentist about using special toothpaste for sensitive teeth. To reduce pain on contact with heat or cold or when brushing, brush with this toothpaste regularly or rub a small amount of the paste on the sensitive area with a clean finger 2 or 3 times a day. Floss gently between your teeth. · Do not smoke or use spit tobacco. Tobacco use can make gum problems worse, decreases your ability to fight infection in your gums, and delays healing. If you need help quitting, talk to your doctor about stop-smoking programs and medicines. These can increase your chances of quitting for good. When should you call for help? Call 911 anytime you think you may need emergency care. For example, call if:    · You have trouble breathing.    Call your dentist or doctor now or seek immediate medical care if:    · You have signs of infection, such as:  ¨ Increased pain, swelling, warmth, or redness. ¨ Red streaks leading from the area. ¨ Pus draining from the area. ¨ A fever.    Watch closely for changes in your health, and be sure to contact your doctor if:    · You do not get better as expected. Where can you learn more? Go to http://will-randi.info/. Enter 0363 [id number] in the search box to learn more about \"Tooth and Gum Pain: Care Instructions. \"  Current as of: May 12, 2017  Content Version: 11.7  © 3582-4270 [employer].  Care instructions adapted under license by [employer] (which disclaims liability or warranty for this information). If you have questions about a medical condition or this instruction, always ask your healthcare professional. [place] [address] any warranty or liability for your use of this information.

## 2018-07-17 NOTE — ED NOTES
Patient discharged by [facility]. Patient provided with discharge instructions Rx and instructions on follow up care. Patient out of ED.

## 2018-07-19 ENCOUNTER — HOSPITAL ENCOUNTER (EMERGENCY)
Age: 43
Discharge: HOME | End: 2018-07-19
Attending: EMERGENCY MEDICINE | Admitting: EMERGENCY MEDICINE
Payer: SELF-PAY

## 2018-07-19 VITALS
OXYGEN SATURATION: 99 % | TEMPERATURE: 98 F | SYSTOLIC BLOOD PRESSURE: 105 MMHG | HEIGHT: 65 IN | BODY MASS INDEX: 17.66 KG/M2 | DIASTOLIC BLOOD PRESSURE: 63 MMHG | RESPIRATION RATE: 22 BRPM | HEART RATE: 60 BPM | WEIGHT: 106 LBS

## 2018-07-19 DIAGNOSIS — R42 VERTIGO: Primary | ICD-10-CM

## 2018-07-19 LAB
ALBUMIN SERPL-MCNC: 3.9 G/DL (ref 3.5–5)
ALBUMIN/GLOB SERPL: 1.1 {RATIO} (ref 1.1–2.2)
ALP SERPL-CCNC: 104 U/L (ref 45–117)
ALT SERPL-CCNC: 18 U/L (ref 12–78)
ANION GAP SERPL CALC-SCNC: 7 MMOL/L (ref 5–15)
APPEARANCE UR: CLEAR
AST SERPL-CCNC: 14 U/L (ref 15–37)
ATRIAL RATE: 64 BPM
BACTERIA URNS QL MICRO: ABNORMAL /HPF
BASOPHILS # BLD: 0.1 K/UL (ref 0–0.1)
BASOPHILS NFR BLD: 1 % (ref 0–1)
BILIRUB SERPL-MCNC: 0.3 MG/DL (ref 0.2–1)
BILIRUB UR QL: NEGATIVE
BUN SERPL-MCNC: 6 MG/DL (ref 6–20)
BUN/CREAT SERPL: 9 (ref 12–20)
CALCIUM SERPL-MCNC: 9.3 MG/DL (ref 8.5–10.1)
CALCULATED P AXIS, ECG09: 87 DEGREES
CALCULATED R AXIS, ECG10: -86 DEGREES
CALCULATED T AXIS, ECG11: -10 DEGREES
CHLORIDE SERPL-SCNC: 104 MMOL/L (ref 97–108)
CO2 SERPL-SCNC: 28 MMOL/L (ref 21–32)
COLOR UR: ABNORMAL
CREAT SERPL-MCNC: 0.69 MG/DL (ref 0.55–1.02)
DIAGNOSIS, 93000: NORMAL
DIFFERENTIAL METHOD BLD: ABNORMAL
EOSINOPHIL # BLD: 0.2 K/UL (ref 0–0.4)
EOSINOPHIL NFR BLD: 2 % (ref 0–7)
EPITH CASTS URNS QL MICRO: ABNORMAL /LPF
ERYTHROCYTE [DISTWIDTH] IN BLOOD BY AUTOMATED COUNT: 13.2 % (ref 11.5–14.5)
GLOBULIN SER CALC-MCNC: 3.7 G/DL (ref 2–4)
GLUCOSE SERPL-MCNC: 96 MG/DL (ref 65–100)
GLUCOSE UR STRIP.AUTO-MCNC: NEGATIVE MG/DL
HCT VFR BLD AUTO: 40.4 % (ref 35–47)
HGB BLD-MCNC: 13.6 G/DL (ref 11.5–16)
HGB UR QL STRIP: NEGATIVE
HYALINE CASTS URNS QL MICRO: ABNORMAL /LPF (ref 0–5)
IMM GRANULOCYTES # BLD: 0 K/UL (ref 0–0.04)
IMM GRANULOCYTES NFR BLD AUTO: 0 % (ref 0–0.5)
KETONES UR QL STRIP.AUTO: NEGATIVE MG/DL
LEUKOCYTE ESTERASE UR QL STRIP.AUTO: ABNORMAL
LYMPHOCYTES # BLD: 2.1 K/UL (ref 0.8–3.5)
LYMPHOCYTES NFR BLD: 17 % (ref 12–49)
MCH RBC QN AUTO: 31.3 PG (ref 26–34)
MCHC RBC AUTO-ENTMCNC: 33.7 G/DL (ref 30–36.5)
MCV RBC AUTO: 92.9 FL (ref 80–99)
MONOCYTES # BLD: 0.9 K/UL (ref 0–1)
MONOCYTES NFR BLD: 7 % (ref 5–13)
NEUTS SEG # BLD: 9 K/UL (ref 1.8–8)
NEUTS SEG NFR BLD: 73 % (ref 32–75)
NITRITE UR QL STRIP.AUTO: NEGATIVE
NRBC # BLD: 0 K/UL (ref 0–0.01)
NRBC BLD-RTO: 0 PER 100 WBC
P-R INTERVAL, ECG05: 206 MS
PH UR STRIP: 7 [PH] (ref 5–8)
PLATELET # BLD AUTO: 213 K/UL (ref 150–400)
PMV BLD AUTO: 11.5 FL (ref 8.9–12.9)
POTASSIUM SERPL-SCNC: 3.1 MMOL/L (ref 3.5–5.1)
PROT SERPL-MCNC: 7.6 G/DL (ref 6.4–8.2)
PROT UR STRIP-MCNC: NEGATIVE MG/DL
Q-T INTERVAL, ECG07: 468 MS
QRS DURATION, ECG06: 144 MS
QTC CALCULATION (BEZET), ECG08: 482 MS
RBC # BLD AUTO: 4.35 M/UL (ref 3.8–5.2)
RBC #/AREA URNS HPF: ABNORMAL /HPF (ref 0–5)
SODIUM SERPL-SCNC: 139 MMOL/L (ref 136–145)
SP GR UR REFRACTOMETRY: 1.02 (ref 1–1.03)
TROPONIN I SERPL-MCNC: <0.05 NG/ML
UROBILINOGEN UR QL STRIP.AUTO: 1 EU/DL (ref 0.2–1)
VENTRICULAR RATE, ECG03: 64 BPM
WBC # BLD AUTO: 12.3 K/UL (ref 3.6–11)
WBC URNS QL MICRO: ABNORMAL /HPF (ref 0–4)

## 2018-07-19 PROCEDURE — 96360 HYDRATION IV INFUSION INIT: CPT

## 2018-07-19 PROCEDURE — 81001 URINALYSIS AUTO W/SCOPE: CPT | Performed by: EMERGENCY MEDICINE

## 2018-07-19 PROCEDURE — 36415 COLL VENOUS BLD VENIPUNCTURE: CPT | Performed by: EMERGENCY MEDICINE

## 2018-07-19 PROCEDURE — 96361 HYDRATE IV INFUSION ADD-ON: CPT

## 2018-07-19 PROCEDURE — 93005 ELECTROCARDIOGRAM TRACING: CPT

## 2018-07-19 PROCEDURE — 99285 EMERGENCY DEPT VISIT HI MDM: CPT

## 2018-07-19 PROCEDURE — 74011250636 HC RX REV CODE- 250/636: Performed by: EMERGENCY MEDICINE

## 2018-07-19 PROCEDURE — 84484 ASSAY OF TROPONIN QUANT: CPT | Performed by: EMERGENCY MEDICINE

## 2018-07-19 PROCEDURE — 85025 COMPLETE CBC W/AUTO DIFF WBC: CPT | Performed by: EMERGENCY MEDICINE

## 2018-07-19 PROCEDURE — 80053 COMPREHEN METABOLIC PANEL: CPT | Performed by: EMERGENCY MEDICINE

## 2018-07-19 RX ORDER — MECLIZINE HCL 12.5 MG 12.5 MG/1
25 TABLET ORAL
Status: COMPLETED | OUTPATIENT
Start: 2018-07-19 | End: 2018-07-19

## 2018-07-19 RX ORDER — ONDANSETRON 4 MG/1
4 TABLET, ORALLY DISINTEGRATING ORAL
Qty: 10 TAB | Refills: 0 | Status: SHIPPED | OUTPATIENT
Start: 2018-07-19

## 2018-07-19 RX ORDER — MECLIZINE HYDROCHLORIDE 25 MG/1
25 TABLET ORAL
Qty: 20 TAB | Refills: 0 | OUTPATIENT
Start: 2018-07-19 | End: 2021-01-01

## 2018-07-19 RX ADMIN — SODIUM CHLORIDE 1000 ML: 900 INJECTION, SOLUTION INTRAVENOUS at 07:58

## 2018-07-19 RX ADMIN — MECLIZINE 25 MG: 12.5 TABLET ORAL at 07:50

## 2018-07-19 NOTE — ED NOTES
Dr Nataly Vargas reviewed discharge instructions with the patient. The patient verbalized understanding. All questions and concerns were addressed. The patient declined a wheelchair and is discharged ambulatory with instructions and prescriptions in hand. Pt is alert and oriented x 4. Respirations are clear and unlabored.

## 2018-07-19 NOTE — ED PROVIDER NOTES
EMERGENCY DEPARTMENT HISTORY AND PHYSICAL EXAM 
 
 
Date: 7/19/2018 Patient Name: Heather Ojeda History of Presenting Illness Chief Complaint Patient presents with  Dental Pain  
  arrives via ems with continued dental pain, nausea, ems gave 4 mg zofran odt  Dizziness Per patient she was seen here 2 days ago for c/o R lower dental pain but is now having dizziness w/ nausea & reports \"I feel like my pacemaker may not be working. \" History Provided By: Patient HPI: Heather Ojeda, 43 y.o. female with PMHx significant for anxiety, depression, and HA, presents via EMS to the ED with cc of dizziness and lightheadedness x ~2 days. She also c/o nausea and vomiting x1 today. Pt reports she has hx of vertigo, and has been experiencing similar symptoms intermittently x ~1.5 weeks. She also c/o left lower dental pain x ~5 days, for which she was seen at [facility] ED ~2 days ago, and notes she has follow up with a Dentist tomorrow (7/20/18). She notes she received Zofran en route to ED with EMS, but otherwise denies taking any medications for her symptoms. She specifically denies any fevers, chills, chest pain, shortness of breath, headache, rash, diarrhea, sweating or weight loss. Pt notes she smokes ~0.5 ppd. There are no other complaints, changes, or physical findings at this time. PCP: None Current Outpatient Prescriptions Medication Sig Dispense Refill  meclizine (ANTIVERT) 25 mg tablet Take 1 Tab by mouth three (3) times daily as needed for Dizziness. 20 Tab 0  
 ondansetron (ZOFRAN ODT) 4 mg disintegrating tablet Take 1 Tab by mouth every eight (8) hours as needed for Nausea. 10 Tab 0  
 penicillin v potassium (VEETID) 500 mg tablet Take 1 Tab by mouth four (4) times daily for 7 days. 28 Tab 0  
 lidocaine (URO-JET) 2 % jelp jelly Apply to affected area three times daily as needed.  6 mL 0  
 traMADol (ULTRAM) 50 mg tablet Take 1 Tab by mouth every six (6) hours as needed for Pain. Max Daily Amount: 200 mg. Indications: Pain 10 Tab 0 Past History Past Medical History: 
Past Medical History:  
Diagnosis Date  Anxiety  Depression  Gyn exam   
 Dr Adali Brewer  Headache(784.0)  Panic attack  Postpartum hemorrhage 1/2010  Psychiatric disorder   
 depression  Sleep trouble  Tobacco use disorder Past Surgical History: 
Past Surgical History:  
Procedure Laterality Date [address] [facility]  2003, 1/2010  HX GYN    
 D+C x 2  
 HX HEENT    
 tonsillectomy  HX PACEMAKER  09/2013  HX TUBAL LIGATION  4-2010 Family History: 
Family History Problem Relation Age of Onset  Cancer Maternal Grandmother  Diabetes Maternal Grandmother  Heart Disease Maternal Grandmother  Hypertension Maternal Grandmother  Hypertension Mother Social History: 
Social History Substance Use Topics  Smoking status: Current Every Day Smoker Packs/day: 0.50 Years: 10.00 Types: Cigarettes  Smokeless tobacco: Never Used  Alcohol use No  
   Comment: patient states she quit Allergies: 
No Known Allergies Review of Systems Review of Systems Constitutional: Negative. Negative for activity change, appetite change, chills, fatigue, fever and unexpected weight change. HENT: Positive for dental problem (left lower). Negative for congestion, hearing loss, rhinorrhea, sneezing and voice change. Eyes: Negative. Negative for pain and visual disturbance. Respiratory: Negative. Negative for apnea, cough, choking, chest tightness and shortness of breath. Cardiovascular: Negative. Negative for chest pain and palpitations. Gastrointestinal: Positive for nausea and vomiting. Negative for abdominal distention, abdominal pain, blood in stool and diarrhea. Genitourinary: Negative. Negative for difficulty urinating, flank pain, frequency and urgency. No discharge Musculoskeletal: Negative. Negative for arthralgias, back pain, myalgias and neck stiffness. Skin: Negative. Negative for color change and rash. Neurological: Positive for dizziness and light-headedness. Negative for seizures, syncope, speech difficulty, weakness, numbness and headaches. Hematological: Negative for adenopathy. Psychiatric/Behavioral: Negative. Negative for agitation, behavioral problems, dysphoric mood and suicidal ideas. The patient is not nervous/anxious. Physical Exam  
Physical Exam  
Constitutional: She is oriented to person, place, and time. She appears well-developed and well-nourished. No distress. HENT:  
Head: Normocephalic and atraumatic. Mouth/Throat: Oropharynx is clear and moist. No oropharyngeal exudate. Mild diffuse dental caries Eyes: Conjunctivae and EOM are normal. Pupils are equal, round, and reactive to light. Right eye exhibits no discharge. Left eye exhibits no discharge. Neck: Normal range of motion. Neck supple. Cardiovascular: Normal rate, regular rhythm and intact distal pulses. Exam reveals no gallop and no friction rub. No murmur heard. Pulmonary/Chest: Effort normal and breath sounds normal. No respiratory distress. She has no wheezes. She has no rales. She exhibits no tenderness. Pacemaker left upper chest wall Abdominal: Soft. Bowel sounds are normal. She exhibits no distension and no mass. There is no tenderness. There is no rebound and no guarding. Musculoskeletal: Normal range of motion. She exhibits no edema. Lymphadenopathy:  
  She has no cervical adenopathy. Neurological: She is alert and oriented to person, place, and time. No cranial nerve deficit. Coordination normal.  
Skin: Skin is warm and dry. No rash noted. No erythema. Psychiatric: She has a normal mood and affect. Nursing note and vitals reviewed. Diagnostic Study Results Labs - Recent Results (from the past 12 hour(s)) EKG, 12 LEAD, INITIAL  Collection Time: 07/19/18  7:36 AM  
Result Value Ref Range Ventricular Rate 64 BPM  
 Atrial Rate 64 BPM  
 P-R Interval 206 ms QRS Duration 144 ms Q-T Interval 468 ms QTC Calculation (Bezet) 482 ms Calculated P Axis 87 degrees Calculated R Axis -86 degrees Calculated T Axis -10 degrees Diagnosis AV dual-paced rhythm When compared with ECG of 12-MAY-2015 21:17, 
Vent. rate has decreased BY   8 BPM 
  
CBC WITH AUTOMATED DIFF Collection Time: 07/19/18  8:04 AM  
Result Value Ref Range WBC 12.3 (H) 3.6 - 11.0 K/uL  
 RBC 4.35 3.80 - 5.20 M/uL  
 HGB 13.6 11.5 - 16.0 g/dL HCT 40.4 35.0 - 47.0 % MCV 92.9 80.0 - 99.0 FL  
 MCH 31.3 26.0 - 34.0 PG  
 MCHC 33.7 30.0 - 36.5 g/dL  
 RDW 13.2 11.5 - 14.5 % PLATELET 763 072 - 789 K/uL MPV 11.5 8.9 - 12.9 FL  
 NRBC 0.0 0  WBC ABSOLUTE NRBC 0.00 0.00 - 0.01 K/uL NEUTROPHILS 73 32 - 75 % LYMPHOCYTES 17 12 - 49 % MONOCYTES 7 5 - 13 % EOSINOPHILS 2 0 - 7 % BASOPHILS 1 0 - 1 % IMMATURE GRANULOCYTES 0 0.0 - 0.5 % ABS. NEUTROPHILS 9.0 (H) 1.8 - 8.0 K/UL  
 ABS. LYMPHOCYTES 2.1 0.8 - 3.5 K/UL  
 ABS. MONOCYTES 0.9 0.0 - 1.0 K/UL  
 ABS. EOSINOPHILS 0.2 0.0 - 0.4 K/UL  
 ABS. BASOPHILS 0.1 0.0 - 0.1 K/UL  
 ABS. IMM. GRANS. 0.0 0.00 - 0.04 K/UL  
 DF AUTOMATED METABOLIC PANEL, COMPREHENSIVE Collection Time: 07/19/18  8:04 AM  
Result Value Ref Range Sodium 139 136 - 145 mmol/L Potassium 3.1 (L) 3.5 - 5.1 mmol/L Chloride 104 97 - 108 mmol/L  
 CO2 28 21 - 32 mmol/L Anion gap 7 5 - 15 mmol/L Glucose 96 65 - 100 mg/dL BUN 6 6 - 20 MG/DL Creatinine 0.69 0.55 - 1.02 MG/DL  
 BUN/Creatinine ratio 9 (L) 12 - 20 GFR est AA >60 >60 ml/min/1.73m2 GFR est non-AA >60 >60 ml/min/1.73m2 Calcium 9.3 8.5 - 10.1 MG/DL Bilirubin, total 0.3 0.2 - 1.0 MG/DL  
 ALT (SGPT) 18 12 - 78 U/L  
 AST (SGOT) 14 (L) 15 - 37 U/L Alk. phosphatase 104 45 - 117 U/L Protein, total 7.6 6.4 - 8.2 g/dL  Albumin 3.9 3.5 - 5.0 g/dL Globulin 3.7 2.0 - 4.0 g/dL A-G Ratio 1.1 1.1 - 2.2    
TROPONIN I Collection Time: 07/19/18  8:04 AM  
Result Value Ref Range Troponin-I, Qt. <0.05 <0.05 ng/mL URINALYSIS W/MICROSCOPIC Collection Time: 07/19/18  8:04 AM  
Result Value Ref Range Color YELLOW/STRAW Appearance CLEAR CLEAR Specific gravity 1.017 1.003 - 1.030    
 pH (UA) 7.0 5.0 - 8.0 Protein NEGATIVE  NEG mg/dL Glucose NEGATIVE  NEG mg/dL Ketone NEGATIVE  NEG mg/dL Bilirubin NEGATIVE  NEG Blood NEGATIVE  NEG Urobilinogen 1.0 0.2 - 1.0 EU/dL Nitrites NEGATIVE  NEG Leukocyte Esterase TRACE (A) NEG    
 WBC 0-4 0 - 4 /hpf  
 RBC 0-5 0 - 5 /hpf Epithelial cells MODERATE (A) FEW /lpf Bacteria 1+ (A) NEG /hpf Hyaline cast 0-2 0 - 5 /lpf Medical Decision Making I am the first provider for this patient. I reviewed the vital signs, available nursing notes, past medical history, past surgical history, family history and social history. Vital Signs-Reviewed the patient's vital signs. Patient Vitals for the past 12 hrs: 
 Temp Pulse Resp BP SpO2  
07/19/18 0810 - 73 18 - 99 % 07/19/18 0805 - 64 21 126/80 -  
07/19/18 0722 98 °F (36.7 °C) 73 16 109/73 100 % Pulse Oximetry Analysis - 100% on RA Cardiac Monitor:  
Rate: 64 bpm 
Rhythm: Paced EKG interpretation: 7:36 Rhythm: AV dual-paced. Rate (approx.): 64; Axis: normal; IA interval: normal; QRS interval: normal ; ST/T wave: normal. 
 
Records Reviewed: Nursing Notes and Old Medical Records Provider Notes (Medical Decision Making): DDx: ACS, arrhythmia, vertigo ED Course:  
Initial assessment performed. The patients presenting problems have been discussed, and they are in agreement with the care plan formulated and outlined with them. I have encouraged them to ask questions as they arise throughout their visit.  
 
8:51 AM 
Pt re-evaluated, states she is feeling slightly better, slightly less dizzy. 9:18 AM 
The patient states that their symptoms have resolved and they feel much better. There are no other new complaints at this time. Her questions have been answered. We are awaiting final results and those will be reviewed with them when they become available. Disposition: 
9:18 AM 
Alisson Andrade's  results have been reviewed with her. She has been counseled regarding her diagnosis. She verbally conveys understanding and agreement of the signs, symptoms, diagnosis, treatment and prognosis and additionally agrees to follow up as recommended with Dr. None in 24 - 48 hours. She also agrees with the care-plan and conveys that all of her questions have been answered. I have also put together some discharge instructions for her that include: 1) educational information regarding their diagnosis, 2) how to care for their diagnosis at home, as well a 3) list of reasons why they would want to return to the ED prior to their follow-up appointment, should their condition change. PLAN: 
1. Current Discharge Medication List  
  
START taking these medications Details  
meclizine (ANTIVERT) 25 mg tablet Take 1 Tab by mouth three (3) times daily as needed for Dizziness. Qty: 20 Tab, Refills: 0  
  
ondansetron (ZOFRAN ODT) 4 mg disintegrating tablet Take 1 Tab by mouth every eight (8) hours as needed for Nausea. Qty: 10 Tab, Refills: 0  
  
  
 
2. Follow-up Information Follow up With Details Comments Contact Info None   None ([phone]) Patient stated that they have no PCP [facility]  As needed, If symptoms worsen [address] [facility] [address] [place] [address] 
[phone] Return to ED if worse Diagnosis Clinical Impression: 1. Vertigo Attestations:  
 
This note is prepared by Cyndie Michelle, acting as Scribe for Jojo Ag MD. 
 
Jojo Ag MD: The scribe's documentation has been prepared under my direction and personally reviewed by me in its entirety. I confirm that the note above accurately reflects all work, treatment, procedures, and medical decision making performed by me.

## 2018-07-19 NOTE — ED NOTES
Pt presents via EMS with c/o right lower dental pain that started a few days ago. Was seen in this ED 2 days ago with no improvement. Pt reports dentist appointment tomorrow. Pt reports feelings of intermittent dizziness that became worse this morning with N/V. Pt reports hx of pacemaker. Pt moved to to ED bed to monitor x2.

## 2018-07-19 NOTE — DISCHARGE INSTRUCTIONS
Vertigo: Care Instructions  Your Care Instructions    Vertigo is the feeling that you or your surroundings are moving when there is no actual movement. It is often described as a feeling of spinning, whirling, falling, or tilting. Vertigo may make you vomit or feel nauseated. You may have trouble standing or walking and may lose your balance. Vertigo is often related to an inner ear problem, but it can have other more serious causes. If vertigo continues, you may need more tests to find its cause. Follow-up care is a key part of your treatment and safety. Be sure to make and go to all appointments, and call your doctor if you are having problems. It's also a good idea to know your test results and keep a list of the medicines you take. How can you care for yourself at home? · Do not lie flat on your back. Prop yourself up slightly. This may reduce the spinning feeling. Keep your eyes open. · Move slowly so that you do not fall. · If your doctor recommends medicine, take it exactly as directed. · Do not drive while you are having vertigo. Certain exercises, called Grewal-Daroff exercises, can help decrease vertigo. To do Grewal-Daroff exercises:  · Sit on the edge of a bed or sofa and quickly lie down on the side that causes the worst vertigo. Lie on your side with your ear down. · Stay in this position for at least 30 seconds or until the vertigo goes away. · Sit up. If this causes vertigo, wait for it to stop. · Repeat the procedure on the other side. · Repeat this 10 times. Do these exercises 2 times a day until the vertigo is gone. When should you call for help? Call 911 anytime you think you may need emergency care. For example, call if:    · You passed out (lost consciousness).     · You have symptoms of a stroke. These may include:  ¨ Sudden numbness, tingling, weakness, or loss of movement in your face, arm, or leg, especially on only one side of your body.   ¨ Sudden vision changes. ¨ Sudden trouble speaking. ¨ Sudden confusion or trouble understanding simple statements. ¨ Sudden problems with walking or balance. ¨ A sudden, severe headache that is different from past headaches.    Call your doctor now or seek immediate medical care if:    · Vertigo occurs with a fever, a headache, or ringing in your ears.     · You have new or increased nausea and vomiting.    Watch closely for changes in your health, and be sure to contact your doctor if:    · Vertigo gets worse or happens more often.     · Vertigo has not gotten better after 2 weeks. Where can you learn more? Go to http://will-randi.info/. Enter C151 in the search box to learn more about \"Vertigo: Care Instructions. \"  Current as of: May 12, 2017  Content Version: 11.7  © 7312-8423 [employer]. Care instructions adapted under license by [employer] (which disclaims liability or warranty for this information). If you have questions about a medical condition or this instruction, always ask your healthcare professional. [place] [address] any warranty or liability for your use of this information.

## 2019-01-07 ENCOUNTER — HOSPITAL ENCOUNTER (EMERGENCY)
Age: 44
Discharge: LEFT BEFORE DISCHARGE | End: 2019-01-07
Attending: EMERGENCY MEDICINE
Payer: SELF-PAY

## 2019-01-07 PROCEDURE — 75810000275 HC EMERGENCY DEPT VISIT NO LEVEL OF CARE

## 2019-11-01 ENCOUNTER — APPOINTMENT (OUTPATIENT)
Dept: GENERAL RADIOLOGY | Age: 44
End: 2019-11-01
Attending: NURSE PRACTITIONER
Payer: SELF-PAY

## 2019-11-01 ENCOUNTER — HOSPITAL ENCOUNTER (EMERGENCY)
Age: 44
Discharge: HOME | End: 2019-11-01
Attending: EMERGENCY MEDICINE | Admitting: EMERGENCY MEDICINE
Payer: SELF-PAY

## 2019-11-01 VITALS
BODY MASS INDEX: 18.66 KG/M2 | OXYGEN SATURATION: 100 % | TEMPERATURE: 97.8 F | SYSTOLIC BLOOD PRESSURE: 104 MMHG | HEIGHT: 65 IN | HEART RATE: 91 BPM | RESPIRATION RATE: 18 BRPM | WEIGHT: 111.99 LBS | DIASTOLIC BLOOD PRESSURE: 65 MMHG

## 2019-11-01 DIAGNOSIS — L03.012 CELLULITIS OF FINGER OF LEFT HAND: Primary | ICD-10-CM
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
## 2019-11-01 PROCEDURE — 90471 IMMUNIZATION ADMIN: CPT

## 2019-11-01 PROCEDURE — 99281 EMR DPT VST MAYX REQ PHY/QHP: CPT

## 2019-11-01 PROCEDURE — 73130 X-RAY EXAM OF HAND: CPT

## 2019-11-01 PROCEDURE — 90715 TDAP VACCINE 7 YRS/> IM: CPT | Performed by: NURSE PRACTITIONER

## 2019-11-01 PROCEDURE — 74011250636 HC RX REV CODE- 250/636: Performed by: NURSE PRACTITIONER

## 2019-11-01 RX ORDER — AMOXICILLIN 875 MG/1
875 TABLET, FILM COATED ORAL 2 TIMES DAILY
Qty: 20 TAB | Refills: 0 | Status: SHIPPED | OUTPATIENT
Start: 2019-11-01 | End: 2019-11-11

## 2019-11-01 RX ADMIN — TETANUS TOXOID, REDUCED DIPHTHERIA TOXOID AND ACELLULAR PERTUSSIS VACCINE, ADSORBED 0.5 ML: 5; 2.5; 8; 8; 2.5 SUSPENSION INTRAMUSCULAR at 17:44

## 2019-11-01 NOTE — LETTER
NOTIFICATION RETURN TO WORK / SCHOOL 
 
11/1/2019 5:45 PM 
 
Ms. [address] [facility] [address] To Whom It May Concern: 
 
Ghazal Gregory is currently under the care of [facility] EMERGENCY DEPT. She will return to work/school on: 11/01/2019 If there are questions or concerns please have the patient contact our office.  
 
 
 
Sincerely, 
 
 
Osiris PENA

## 2019-11-01 NOTE — DISCHARGE INSTRUCTIONS
Patient Education        Animal Bites: Care Instructions  Your Care Instructions  After an animal bite, the biggest concern is infection. The chance of infection depends on the type of animal that bit you, where on your body you were bitten, and your general health. Many animal bites are not closed with stitches, because this can increase the chance of infection. Your bite may take as little as 7 days or as long as several months to heal, depending on how bad it is. Taking good care of your wound at home will help it heal and reduce your chance of infection. The doctor has checked you carefully, but problems can develop later. If you notice any problems or new symptoms, get medical treatment right away. Follow-up care is a key part of your treatment and safety. Be sure to make and go to all appointments, and call your doctor if you are having problems. It's also a good idea to know your test results and keep a list of the medicines you take. How can you care for yourself at home? · If your doctor told you how to care for your wound, follow your doctor's instructions. If you did not get instructions, follow this general advice:  ? After 24 to 48 hours, gently wash the wound with clean water 2 times a day. Do not scrub or soak the wound. Don't use hydrogen peroxide or alcohol, which can slow healing. ? You may cover the wound with a thin layer of petroleum jelly, such as Vaseline, and a nonstick bandage. ? Apply more petroleum jelly and replace the bandage as needed. · After you shower, gently dry the wound with a clean towel. · If your doctor has closed the wound, cover the bandage with a plastic bag before you take a shower. · A small amount of skin redness and swelling around the wound edges and the stitches or staples is normal. Your wound may itch or feel irritated. Do not scratch or rub the wound.   · Ask your doctor if you can take an over-the-counter pain medicine, such as acetaminophen (Tylenol), ibuprofen (Advil, Motrin), or naproxen (Aleve). Read and follow all instructions on the label. · Do not take two or more pain medicines at the same time unless the doctor told you to. Many pain medicines have acetaminophen, which is Tylenol. Too much acetaminophen (Tylenol) can be harmful. · If your bite puts you at risk for rabies, you will get a series of shots over the next few weeks to prevent rabies. Your doctor will tell you when to get the shots. It is very important that you get the full cycle of shots. Follow your doctor's instructions exactly. · You may need a tetanus shot if you have not received one in the last 5 years. · If your doctor prescribed antibiotics, take them as directed. Do not stop taking them just because you feel better. You need to take the full course of antibiotics. When should you call for help? Call your doctor now or seek immediate medical care if:    · The skin near the bite turns cold or pale or it changes color.     · You lose feeling in the area near the bite, or it feels numb or tingly.     · You have trouble moving a limb near the bite.     · You have symptoms of infection, such as:  ? Increased pain, swelling, warmth, or redness near the wound. ? Red streaks leading from the wound. ? Pus draining from the wound. ? A fever.     · Blood soaks through the bandage. Oozing small amounts of blood is normal.     · Your pain is getting worse.    Watch closely for changes in your health, and be sure to contact your doctor if you are not getting better as expected. Where can you learn more? Go to http://will-randi.info/. Enter D682 in the search box to learn more about \"Animal Bites: Care Instructions. \"  Current as of: June 26, 2019  Content Version: 12.2  © 7837-2952 [employer]. Care instructions adapted under license by [employer] (which disclaims liability or warranty for this information).  If you have questions about a medical condition or this instruction, always ask your healthcare professional. [address] any warranty or liability for your use of this information. Patient Education        Cellulitis: Care Instructions  Your Care Instructions    Cellulitis is a skin infection caused by bacteria, most often strep or staph. It often occurs after a break in the skin from a scrape, cut, bite, or puncture, or after a rash. Cellulitis may be treated without doing tests to find out what caused it. But your doctor may do tests, if needed, to look for a specific bacteria, like methicillin-resistant Staphylococcus aureus (MRSA). The doctor has checked you carefully, but problems can develop later. If you notice any problems or new symptoms, get medical treatment right away. Follow-up care is a key part of your treatment and safety. Be sure to make and go to all appointments, and call your doctor if you are having problems. It's also a good idea to know your test results and keep a list of the medicines you take. How can you care for yourself at home? · Take your antibiotics as directed. Do not stop taking them just because you feel better. You need to take the full course of antibiotics. · Prop up the infected area on pillows to reduce pain and swelling. Try to keep the area above the level of your heart as often as you can. · If your doctor told you how to care for your wound, follow your doctor's instructions. If you did not get instructions, follow this general advice:  ? Wash the wound with clean water 2 times a day. Don't use hydrogen peroxide or alcohol, which can slow healing. ? You may cover the wound with a thin layer of petroleum jelly, such as Vaseline, and a nonstick bandage. ? Apply more petroleum jelly and replace the bandage as needed. · Be safe with medicines. Take pain medicines exactly as directed.   ? If the doctor gave you a prescription medicine for pain, take it as prescribed. ? If you are not taking a prescription pain medicine, ask your doctor if you can take an over-the-counter medicine. To prevent cellulitis in the future  · Try to prevent cuts, scrapes, or other injuries to your skin. Cellulitis most often occurs where there is a break in the skin. · If you get a scrape, cut, mild burn, or bite, wash the wound with clean water as soon as you can to help avoid infection. Don't use hydrogen peroxide or alcohol, which can slow healing. · If you have swelling in your legs (edema), support stockings and good skin care may help prevent leg sores and cellulitis. · Take care of your feet, especially if you have diabetes or other conditions that increase the risk of infection. Wear shoes and socks. Do not go barefoot. If you have athlete's foot or other skin problems on your feet, talk to your doctor about how to treat them. When should you call for help? Call your doctor now or seek immediate medical care if:    · You have signs that your infection is getting worse, such as:  ? Increased pain, swelling, warmth, or redness. ? Red streaks leading from the area. ? Pus draining from the area. ? A fever.     · You get a rash.    Watch closely for changes in your health, and be sure to contact your doctor if:    · You do not get better as expected. Where can you learn more? Go to http://will-randi.info/. Inez Shannon in the search box to learn more about \"Cellulitis: Care Instructions. \"  Current as of: April 1, 2019  Content Version: 12.2  © 5543-6990 [employer]. Care instructions adapted under license by [employer] (which disclaims liability or warranty for this information). If you have questions about a medical condition or this instruction, always ask your healthcare professional. [address] any warranty or liability for your use of this information.

## 2019-11-01 NOTE — ED PROVIDER NOTES
EMERGENCY DEPARTMENT HISTORY AND PHYSICAL EXAM 
 
 
Date: 11/1/2019 Patient Name: Christie Hwang History of Presenting Illness Chief Complaint Patient presents with  Cat bite The patient presents to the ED with complaints of a cat bite to the left hand that occured on Sunday. History Provided By: Patient HPI: Christie Hwang, 40 y.o. female, presents by POV to the ED with cc of cat bite of the left hand. Patient states she was bitten by her cat on Sunday but wanted to get her finger checked out because it was reddened and swollen around the one bite [place]. Patient denies fever or any other complications at this time. There are no other complaints, changes, or physical findings at this time. PCP: None No current facility-administered medications on file prior to encounter. Current Outpatient Medications on File Prior to Encounter Medication Sig Dispense Refill  meclizine (ANTIVERT) 25 mg tablet Take 1 Tab by mouth three (3) times daily as needed for Dizziness. 20 Tab 0  
 ondansetron (ZOFRAN ODT) 4 mg disintegrating tablet Take 1 Tab by mouth every eight (8) hours as needed for Nausea. 10 Tab 0  
 lidocaine (URO-JET) 2 % jelp jelly Apply to affected area three times daily as needed. 6 mL 0  
 traMADol (ULTRAM) 50 mg tablet Take 1 Tab by mouth every six (6) hours as needed for Pain. Max Daily Amount: 200 mg. Indications: Pain 10 Tab 0 Past History Past Medical History: 
Past Medical History:  
Diagnosis Date  Anxiety  Depression  Gyn exam   
 Dr Liza Lewis  Headache(784.0)  Panic attack  Postpartum hemorrhage 1/2010  Psychiatric disorder   
 depression  Sleep trouble  Tobacco use disorder Past Surgical History: 
Past Surgical History:  
Procedure Laterality Date [id number] [facility]  2003, 1/2010  HX GYN    
 D+C x 2  
 HX HEENT    
 tonsillectomy  HX PACEMAKER  09/2013  HX TUBAL LIGATION  4-2010 Family History: 
Family History Problem Relation Age of Onset  Cancer Maternal Grandmother  Diabetes Maternal Grandmother  Heart Disease Maternal Grandmother  Hypertension Maternal Grandmother  Hypertension Mother Social History: 
Social History Tobacco Use  Smoking status: Current Every Day Smoker Packs/day: 0.50 Years: 10.00 Pack years: 5.00 Types: Cigarettes  Smokeless tobacco: Never Used Substance Use Topics  Alcohol use: No  
  Comment: patient states she quit  Drug use: Yes Types: Prescription Comment: last drug use 6 months ago Allergies: 
No Known Allergies Review of Systems Review of Systems Constitutional: Negative for chills and fever. HENT: Negative for congestion, rhinorrhea and sore throat. Respiratory: Negative for cough and shortness of breath. Cardiovascular: Negative for chest pain. Gastrointestinal: Negative for abdominal pain, nausea and vomiting. Endocrine: Negative for polyuria. Genitourinary: Negative for dysuria and frequency. Skin: Positive for wound. Negative for rash. Neurological: Negative for dizziness, weakness and headaches. All other systems reviewed and are negative. Physical Exam  
Physical Exam  
Constitutional: She is oriented to person, place, and time. She appears well-developed and well-nourished. No distress. 40 y.o. female HENT:  
Head: Normocephalic and atraumatic. Right Ear: External ear normal.  
Left Ear: External ear normal.  
Nose: Nose normal.  
Mouth/Throat: Oropharynx is clear and moist.  
Eyes: Pupils are equal, round, and reactive to light. Conjunctivae and EOM are normal. Right eye exhibits no discharge. Left eye exhibits no discharge. Neck: Normal range of motion. Neck supple. No tracheal deviation present. No thyromegaly present. Cardiovascular: Normal rate, regular rhythm and normal heart sounds. No murmur heard. Pulmonary/Chest: Effort normal and breath sounds normal. No respiratory distress. She has no wheezes. Abdominal: Soft. Bowel sounds are normal. She exhibits no distension. There is no tenderness. There is no rebound. Neurological: She is alert and oriented to person, place, and time. She displays normal reflexes. No cranial nerve deficit. She exhibits normal muscle tone. Coordination normal.  
Skin: Skin is warm and dry. She is not diaphoretic. There is erythema. Psychiatric: She has a normal mood and affect. Her behavior is normal.  
Nursing note and vitals reviewed. Diagnostic Study Results Labs - No results found for this or any previous visit (from the past 12 hour(s)). Radiologic Studies -  
XR HAND LT MIN 3 V Final Result IMPRESSION: No acute abnormality. Medical Decision Making I am the first provider for this patient. I reviewed the vital signs, available nursing notes, past medical history, past surgical history, family history and social history. Vital Signs-Reviewed the patient's vital signs. Patient Vitals for the past 12 hrs: 
 Temp Pulse Resp BP SpO2  
11/01/19 1623 97.8 °F (36.6 °C) 91 18 104/65 100 % Records Reviewed: Nursing Notes, Old Medical Records, Previous Radiology Studies and Previous Laboratory Studies Provider Notes (Medical Decision Making):  
Differential diagnoses include cellulitis, infection of the skin ED Course:  
Initial assessment performed. The patients presenting problems have been discussed, and they are in agreement with the care plan formulated and outlined with them. I have encouraged them to ask questions as they arise throughout their visit.  
 
Discussed with the patient that she needs to begin taking antibiotics as soon as possible and take until the regimen is complete and follow-up with her primary care doctor in 3 to 5 days or come back to the emergency department if symptoms worsen which included increased swelling, increased redness or streaking. Critical Care Time: None Disposition: 
DISCHARGE NOTE: 
5:44 PM 
The pt is ready for discharge. The pt's signs, symptoms, diagnosis, and discharge instructions have been discussed and pt has conveyed their understanding. The pt is to follow up as recommended or return to ER should their symptoms worsen. Plan has been discussed and pt is in agreement. Dorian Gao NP 
11/1/2019 PLAN: 
1. Current Discharge Medication List  
  
START taking these medications Details  
amoxicillin (AMOXIL) 875 mg tablet Take 1 Tab by mouth two (2) times a day for 10 days. Qty: 20 Tab, Refills: 0 CONTINUE these medications which have NOT CHANGED Details  
meclizine (ANTIVERT) 25 mg tablet Take 1 Tab by mouth three (3) times daily as needed for Dizziness. Qty: 20 Tab, Refills: 0  
  
ondansetron (ZOFRAN ODT) 4 mg disintegrating tablet Take 1 Tab by mouth every eight (8) hours as needed for Nausea. Qty: 10 Tab, Refills: 0  
  
lidocaine (URO-JET) 2 % jelp jelly Apply to affected area three times daily as needed. Qty: 6 mL, Refills: 0  
  
traMADol (ULTRAM) 50 mg tablet Take 1 Tab by mouth every six (6) hours as needed for Pain. Max Daily Amount: 200 mg. Indications: Pain 
Qty: 10 Tab, Refills: 0 Associated Diagnoses: [place]; Atypical facial pain 2. Follow-up Information Follow up With Specialties Details Why Contact Info [phone] [facility] Internal Medicine Schedule an appointment as soon as possible for a visit in 1 week As needed, If symptoms worsen, For wound re-check [place] [address]   [address] [address] [phone] Return to ED if worse Diagnosis Clinical Impression: 1. Cellulitis of finger of left hand 2. Cat bite, initial encounter Please note that this dictation was completed with [employer], the computer voice recognition software. Quite often unanticipated grammatical, syntax, homophones, and other interpretive errors are inadvertently transcribed by the computer software. Please disregards these errors. Please excuse any errors that have escaped final proofreading. This note will not be viewable in [address]

## 2020-02-24 ENCOUNTER — APPOINTMENT (OUTPATIENT)
Dept: GENERAL RADIOLOGY | Age: 45
End: 2020-02-24
Attending: PHYSICIAN ASSISTANT
Payer: SELF-PAY

## 2020-02-24 ENCOUNTER — HOSPITAL ENCOUNTER (EMERGENCY)
Age: 45
Discharge: HOME | End: 2020-02-24
Attending: EMERGENCY MEDICINE
Payer: SELF-PAY

## 2020-02-24 VITALS
HEIGHT: 65 IN | TEMPERATURE: 98.1 F | OXYGEN SATURATION: 98 % | RESPIRATION RATE: 20 BRPM | WEIGHT: 105.38 LBS | SYSTOLIC BLOOD PRESSURE: 137 MMHG | DIASTOLIC BLOOD PRESSURE: 70 MMHG | HEART RATE: 95 BPM | BODY MASS INDEX: 17.56 KG/M2

## 2020-02-24 DIAGNOSIS — F17.219 CIGARETTE NICOTINE DEPENDENCE WITH NICOTINE-INDUCED DISORDER: ICD-10-CM

## 2020-02-24 DIAGNOSIS — J11.1 INFLUENZA-LIKE ILLNESS: Primary | ICD-10-CM

## 2020-02-24 DIAGNOSIS — Z71.6 TOBACCO ABUSE COUNSELING: ICD-10-CM

## 2020-02-24 PROCEDURE — 96372 THER/PROPH/DIAG INJ SC/IM: CPT

## 2020-02-24 PROCEDURE — 71046 X-RAY EXAM CHEST 2 VIEWS: CPT

## 2020-02-24 PROCEDURE — 74011250637 HC RX REV CODE- 250/637: Performed by: PHYSICIAN ASSISTANT

## 2020-02-24 PROCEDURE — 99283 EMERGENCY DEPT VISIT LOW MDM: CPT

## 2020-02-24 PROCEDURE — 74011250636 HC RX REV CODE- 250/636: Performed by: PHYSICIAN ASSISTANT

## 2020-02-24 RX ORDER — METHOCARBAMOL 750 MG/1
750 TABLET, FILM COATED ORAL 4 TIMES DAILY
Qty: 20 TAB | Refills: 0 | Status: SHIPPED | OUTPATIENT
Start: 2020-02-24

## 2020-02-24 RX ORDER — BENZONATATE 100 MG/1
100 CAPSULE ORAL
Qty: 30 CAP | Refills: 0 | Status: SHIPPED | OUTPATIENT
Start: 2020-02-24 | End: 2020-03-05

## 2020-02-24 RX ORDER — FLUTICASONE PROPIONATE 50 MCG
2 SPRAY, SUSPENSION (ML) NASAL DAILY
Qty: 1 BOTTLE | Refills: 0 | Status: SHIPPED | OUTPATIENT
Start: 2020-02-24

## 2020-02-24 RX ORDER — KETOROLAC TROMETHAMINE 30 MG/ML
30 INJECTION, SOLUTION INTRAMUSCULAR; INTRAVENOUS
Status: COMPLETED | OUTPATIENT
Start: 2020-02-24 | End: 2020-02-24

## 2020-02-24 RX ORDER — BUTALBITAL, ACETAMINOPHEN AND CAFFEINE 50; 325; 40 MG/1; MG/1; MG/1
1 TABLET ORAL
Status: COMPLETED | OUTPATIENT
Start: 2020-02-24 | End: 2020-02-24

## 2020-02-24 RX ADMIN — KETOROLAC TROMETHAMINE 30 MG: 30 INJECTION, SOLUTION INTRAMUSCULAR at 08:09

## 2020-02-24 RX ADMIN — BUTALBITAL, ACETAMINOPHEN AND CAFFEINE 1 TABLET: 50; 325; 40 TABLET ORAL at 08:09

## 2020-02-24 NOTE — ED NOTES
MARILOU Giraldo Pencil at bedside to provide discharge paperwork. Vital signs stable. Pt in no apparent distress at this time. Mental status at baseline. Ambulatory to waiting room with steady gate, discharge paperwork in hand. Patient and or family acknowledged and signed discharge instructions that were created/provided by the Physician. Signed discharge form with patient label placed in scan box.

## 2020-02-24 NOTE — ED PROVIDER NOTES
EMERGENCY DEPARTMENT HISTORY AND PHYSICAL EXAM 
 
 
Date: 2/24/2020 Patient Name: Neena Hu Please note that this dictation was completed with [employer], the computer voice recognition software. Quite often unanticipated grammatical, syntax, homophones, and other interpretive errors are inadvertently transcribed by the computer software. Please disregard these errors. Please excuse any errors that have escaped final proofreading. History of Presenting Illness Chief Complaint Patient presents with  Flu Like Symptoms Patient states for 3 days she has been running a fever, had diarrhea, headache, sore throat, dizziness, and muscle aches. History Provided By: Patient HPI: Neena Hu, 40 y.o. female with PMHx significant for anxiety, depression, tobacco abuse, presents ambulatory to the ED with cc of cough, congestion, rhinorrhea, subjective fever, chills, HA, body aches since Friday. Pt reports that her boyfriend and his father have been sick with similar symptoms. She did not receive a flu shot this year. No medication PTA. Denies abdominal pain, N/V, vision changes, weakness. PCP: None There are no other complaints, changes, or physical findings at this time. Current Outpatient Medications Medication Sig Dispense Refill  methocarbamol (ROBAXIN-750) 750 mg tablet Take 1 Tab by mouth four (4) times daily. Indications: muscle spasm 20 Tab 0  
 dextromethorphan-guaiFENesin (ROBITUSSIN COUGH-CHEST EUSEBIO DM) 5-100 mg/5 mL liqd Take 10 mL by mouth every six (6) hours. 118 mL 0  
 fluticasone propionate (FLONASE) 50 mcg/actuation nasal spray 2 Sprays by Both Nostrils route daily. 1 Bottle 0  
 benzonatate (TESSALON PERLES) 100 mg capsule Take 1 Cap by mouth three (3) times daily as needed for Cough for up to 10 days. 30 Cap 0  
 meclizine (ANTIVERT) 25 mg tablet Take 1 Tab by mouth three (3) times daily as needed for Dizziness.  20 Tab 0  
  ondansetron (ZOFRAN ODT) 4 mg disintegrating tablet Take 1 Tab by mouth every eight (8) hours as needed for Nausea. 10 Tab 0  
 lidocaine (URO-JET) 2 % jelp jelly Apply to affected area three times daily as needed. 6 mL 0  
 traMADol (ULTRAM) 50 mg tablet Take 1 Tab by mouth every six (6) hours as needed for Pain. Max Daily Amount: 200 mg. Indications: Pain 10 Tab 0 Past History Past Medical History: 
Past Medical History:  
Diagnosis Date  Anxiety  Depression  Gyn exam   
 Dr Violet Chirinos  Headache(784.0)  Panic attack  Postpartum hemorrhage 1/2010  Psychiatric disorder   
 depression  Sleep trouble  Tobacco use disorder Past Surgical History: 
Past Surgical History:  
Procedure Laterality Date [address] [facility]  2003, 1/2010  HX GYN    
 D+C x 2  
 HX HEENT    
 tonsillectomy  HX PACEMAKER  09/2013  HX TUBAL LIGATION  4-2010 Family History: 
Family History Problem Relation Age of Onset  Cancer Maternal Grandmother  Diabetes Maternal Grandmother  Heart Disease Maternal Grandmother  Hypertension Maternal Grandmother  Hypertension Mother Social History: 
Social History Tobacco Use  Smoking status: Current Every Day Smoker Packs/day: 0.50 Years: 10.00 Pack years: 5.00 Types: Cigarettes  Smokeless tobacco: Never Used Substance Use Topics  Alcohol use: No  
  Comment: patient states she quit  Drug use: Yes Types: Prescription Comment: last drug use 6 months ago Allergies: 
No Known Allergies Review of Systems Review of Systems Constitutional: Positive for chills and fever. HENT: Positive for congestion, rhinorrhea, sneezing and sore throat. Eyes: Negative for pain and visual disturbance. Respiratory: Negative for cough and shortness of breath. Cardiovascular: Negative for chest pain and palpitations. Gastrointestinal: Positive for diarrhea (now resolved). Negative for abdominal pain, nausea and vomiting. Genitourinary: Negative for dysuria and hematuria. Musculoskeletal: Negative for arthralgias and myalgias. Skin: Negative for color change, rash and wound. Neurological: Positive for dizziness. Negative for numbness and headaches. All other systems reviewed and are negative. Physical Exam  
Physical Exam 
Vitals signs and nursing note reviewed. Constitutional:   
   General: She is not in acute distress. Appearance: She is well-developed. She is not diaphoretic. Comments: 40 y.o. female in NAD. Thin. Communicates appropriately and in full sentences HENT:  
   Head: Normocephalic and atraumatic. Right Ear: Tympanic membrane and ear canal normal.  
   Left Ear: Tympanic membrane and ear canal normal.  
   Nose: Congestion and rhinorrhea present. Mouth/Throat:  
   Mouth: Mucous membranes are moist.  
Eyes:  
   General:     
   Right eye: No discharge. Left eye: No discharge. Conjunctiva/sclera: Conjunctivae normal.  
   Pupils: Pupils are equal, round, and reactive to light. Neck: Musculoskeletal: Normal range of motion and neck supple. Comments: No nuchal rigidity Cardiovascular:  
   Rate and Rhythm: Normal rate and regular rhythm. Pulses: Normal pulses. Heart sounds: Normal heart sounds. Pulmonary:  
   Effort: Pulmonary effort is normal. No respiratory distress. Breath sounds: No wheezing. Comments: +coarse breath sounds, dry cough on exam 
Abdominal:  
   General: Abdomen is flat. There is no distension. Palpations: Abdomen is soft. Tenderness: There is no abdominal tenderness. Musculoskeletal: Normal range of motion. General: No tenderness or deformity. Comments: No neurologic or vascular compromise on exam.   
Skin: 
   General: Skin is warm and dry. Coloration: Skin is not pale. Findings: No erythema or rash. Neurological:  
   Mental Status: She is alert and oriented to person, place, and time. Coordination: Coordination normal.  
 
 
 
 
Diagnostic Study Results Labs - No results found for this or any previous visit (from the past 12 hour(s)). Radiologic Studies -  
XR CHEST PA LAT Final Result IMPRESSION: No acute cardiopulmonary disease. CT Results  (Last 48 hours) None CXR Results  (Last 48 hours) 02/24/20 0830  XR CHEST PA LAT Final result Impression:  IMPRESSION: No acute cardiopulmonary disease. Narrative: Indication:  productive cough Exam: PA and lateral views of the chest.  
   
Direct comparison is made to prior CXR dated May 2015. Findings: Cardiomediastinal silhouette is within normal limits. Intact pacer  
leads extend to the right atrium and right ventricle. Lungs are clear  
bilaterally. Pleural spaces are normal. Osseous structures are intact. Medical Decision Making I am the first provider for this patient. I reviewed the vital signs, available nursing notes, past medical history, past surgical history, family history and social history. Vital Signs-Reviewed the patient's vital signs. Patient Vitals for the past 12 hrs: 
 Temp Pulse Resp BP SpO2  
02/24/20 0805 98.1 °F (36.7 °C) 95 20 137/70 98 % Records Reviewed: Nursing Notes and Old Medical Records Provider Notes (Medical Decision Making): DDx: bacterial sinusitis vs. pharyngitis, migraine, flu, viral illness, rhinovirus, URI, bronchitis The patient complains of nasal congestion, rhinorrhea, and sore throat. Has non-productive cough without dyspnea or wheezing. Symptoms consistent with an uncomplicated viral URI.   
Pt is provided with education, including that of supportive care, proper hydration, handwashing, and avoidance of sick contacts. Symptomatic therapy suggested: ibuprofen (every 6-8 hours) and tylenol (every 4-6 hrs), antihistamine-decongestant of choice, cough suppressant of choice. Increase fluids, use vaporizer, stay in steamy bathroom tid 15 min prn severe cough, tylenol as needed, rest, avoid smoky areas. Lack of antibiotic effectiveness discussed with pt. Follow up prn if not better in 72 hours or return to ED for acute worsening of symptoms. ED Course:  
Initial assessment performed. The patients presenting problems have been discussed, and they are in agreement with the care plan formulated and outlined with them. I have encouraged them to ask questions as they arise throughout their visit. TOBACCO COUNSELING: 
Spent 1-5 minutes discussing the risks of smoking and the benefits of smoking cessation as well as the long term sequelae of smoking with the pt who verbalized his understanding. Reviewed strategies for success, including gradually decreasing the number of cigarettes smoked a day. DISCHARGE NOTE: 
Brodie Andrade's  results have been reviewed with her. She has been counseled regarding her diagnosis. She verbally conveys understanding and agreement of the signs, symptoms, diagnosis, treatment and prognosis and additionally agrees to follow up as recommended with Dr. None in 24 - 48 hours. She also agrees with the care-plan and conveys that all of her questions have been answered. I have also put together some discharge instructions for her that include: 1) educational information regarding their diagnosis, 2) how to care for their diagnosis at home, as well a 3) list of reasons why they would want to return to the ED prior to their follow-up appointment, should their condition change. She and/or family's questions have been answered. I have encouraged them to see the official results in [facility]\" or to retrieve the specifics of their results from medical records. PLAN: 
1. Return precautions as discussed 2. Follow-up with providers as directed 3. Medications as prescribed Return to ED if worse Diagnosis Clinical Impression:  
1. Influenza-like illness 2. Tobacco abuse counseling 3. Cigarette nicotine dependence with nicotine-induced disorder Discharge Medication List as of 2/24/2020  8:46 AM  
  
START taking these medications Details  
methocarbamol (ROBAXIN-750) 750 mg tablet Take 1 Tab by mouth four (4) times daily. Indications: muscle spasm, Normal, Disp-20 Tab, R-0  
  
dextromethorphan-guaiFENesin (ROBITUSSIN COUGH-CHEST EUSEBIO DM) 5-100 mg/5 mL liqd Take 10 mL by mouth every six (6) hours. , Normal, Disp-118 mL, R-0  
  
fluticasone propionate (FLONASE) 50 mcg/actuation nasal spray 2 Sprays by Both Nostrils route daily. , Normal, Disp-1 Bottle, R-0  
  
benzonatate (TESSALON PERLES) 100 mg capsule Take 1 Cap by mouth three (3) times daily as needed for Cough for up to 10 days. , Normal, Disp-30 Cap, R-0  
  
  
CONTINUE these medications which have NOT CHANGED Details  
meclizine (ANTIVERT) 25 mg tablet Take 1 Tab by mouth three (3) times daily as needed for Dizziness., Normal, Disp-20 Tab, R-0  
  
ondansetron (ZOFRAN ODT) 4 mg disintegrating tablet Take 1 Tab by mouth every eight (8) hours as needed for Nausea., Normal, Disp-10 Tab, R-0  
  
lidocaine (URO-JET) 2 % jelp jelly Apply to affected area three times daily as needed., Normal, Disp-6 mL, R-0  
  
traMADol (ULTRAM) 50 mg tablet Take 1 Tab by mouth every six (6) hours as needed for Pain. Max Daily Amount: 200 mg. Indications: Pain, Print, Disp-10 Tab, R-0 Follow-up Information Follow up With Specialties Details Why Contact Info [facility]  Call today To schedule an appointment as soon as possible. Gaston Armendariz [phone] [facility] EMERGENCY DEPT Emergency Medicine Go to If symptoms worsen [address] [facility] 
[phone] This note will not be viewable in [address]

## 2020-02-24 NOTE — DISCHARGE INSTRUCTIONS
Patient Education      Patient Education        Learning About Benefits From Quitting Smoking  How does quitting smoking make you healthier? If you're thinking about quitting smoking, you may have a few reasons to be smoke-free. Your health may be one of them. · When you quit smoking, you lower your risks for cancer, lung disease, heart attack, stroke, blood vessel disease, and blindness from macular degeneration. · When you're smoke-free, you get sick less often, and you heal faster. You are less likely to get colds, flu, bronchitis, and pneumonia. · As a nonsmoker, you may find that your mood is better and you are less stressed. When and how will you feel healthier? Quitting has real health benefits that start from day 1 of being smoke-free. And the longer you stay smoke-free, the healthier you get and the better you feel. The first hours  · After just 20 minutes, your blood pressure and heart rate go down. That means there's less stress on your heart and blood vessels. · Within 12 hours, the level of carbon monoxide in your blood drops back to normal. That makes room for more oxygen. With more oxygen in your body, you may notice that you have more energy than when you smoked. After 2 weeks  · Your lungs start to work better. · Your risk of heart attack starts to drop. After 1 month  · When your lungs are clear, you cough less and breathe deeper, so it's easier to be active. · Your sense of taste and smell return. That means you can enjoy food more than you have since you started smoking. Over the years  · After 1 year, your risk of heart disease is half what it would be if you kept smoking. · After 5 years, your risk of stroke starts to shrink. Within a few years after that, it's about the same as if you'd never smoked. · After 10 years, your risk of dying from lung cancer is cut by about half. And your risk for many other types of cancer is lower too.   How would quitting help others in your life?  When you quit smoking, you improve the health of everyone who now breathes in your smoke. · Their heart, lung, and cancer risks drop, much like yours. · They are sick less. For babies and small children, living smoke-free means they're less likely to have ear infections, pneumonia, and bronchitis. · If you're a woman who is or will be pregnant someday, quitting smoking means a healthier . · Children who are close to you are less likely to become adult smokers. Where can you learn more? Go to http://willLight Magicrandi.info/. Enter 052 [id number] 72 11 in the search box to learn more about \"Learning About Benefits From Quitting Smoking. \"  Current as of: 2018  Content Version: 12.2  © 2090-4131 [employer]. Care instructions adapted under license by [employer] (which disclaims liability or warranty for this information). If you have questions about a medical condition or this instruction, always ask your healthcare professional. [place] [address] any warranty or liability for your use of this information. Influenza (Flu): Care Instructions  Your Care Instructions    Influenza (flu) is an infection in the lungs and breathing passages. It is caused by the influenza virus. There are different strains, or types, of the flu virus from year to year. Unlike the common cold, the flu comes on suddenly and the symptoms, such as a cough, congestion, fever, chills, fatigue, aches, and pains, are more severe. These symptoms may last up to 10 days. Although the flu can make you feel very sick, it usually doesn't cause serious health problems. Home treatment is usually all you need for flu symptoms. But your doctor may prescribe antiviral medicine to prevent other health problems, such as pneumonia, from developing.  Older people and those who have a long-term health condition, such as lung disease, are most at risk for having pneumonia or other health problems. Follow-up care is a key part of your treatment and safety. Be sure to make and go to all appointments, and call your doctor if you are having problems. It's also a good idea to know your test results and keep a list of the medicines you take. How can you care for yourself at home? · Get plenty of rest.  · Drink plenty of fluids, enough so that your urine is light yellow or clear like water. If you have kidney, heart, or liver disease and have to limit fluids, talk with your doctor before you increase the amount of fluids you drink. · Take an over-the-counter pain medicine if needed, such as acetaminophen (Tylenol), ibuprofen (Advil, Motrin), or naproxen (Aleve), to relieve fever, headache, and muscle aches. Read and follow all instructions on the label. No one younger than 20 should take aspirin. It has been linked to Reye syndrome, a serious illness. · Do not smoke. Smoking can make the flu worse. If you need help quitting, talk to your doctor about stop-smoking programs and medicines. These can increase your chances of quitting for good. · Breathe moist air from a hot shower or from a sink filled with hot water to help clear a stuffy nose. · Before you use cough and cold medicines, check the label. These medicines may not be safe for young children or for people with certain health problems. · If the skin around your nose and lips becomes sore, put some petroleum jelly on the area. · To ease coughing:  ? Drink fluids to soothe a scratchy throat. ? Suck on cough drops or plain hard candy. ? Take an over-the-counter cough medicine that contains dextromethorphan to help you get some sleep. Read and follow all instructions on the label. ? Raise your head at night with an extra pillow. This may help you rest if coughing keeps you awake. · Take any prescribed medicine exactly as directed. Call your doctor if you think you are having a problem with your medicine.   To avoid spreading the flu  · Wash your hands regularly, and keep your hands away from your face. · Stay home from school, work, and other public places until you are feeling better and your fever has been gone for at least 24 hours. The fever needs to have gone away on its own without the help of medicine. · Ask people living with you to talk to their doctors about preventing the flu. They may get antiviral medicine to keep from getting the flu from you. · To prevent the flu in the future, get a flu vaccine every fall. Encourage people living with you to get the vaccine. · Cover your mouth when you cough or sneeze. When should you call for help? Call 911 anytime you think you may need emergency care. For example, call if:    · You have severe trouble breathing.    Call your doctor now or seek immediate medical care if:    · You have new or worse trouble breathing.     · You seem to be getting much sicker.     · You feel very sleepy or confused.     · You have a new or higher fever.     · You get a new rash.    Watch closely for changes in your health, and be sure to contact your doctor if:    · You begin to get better and then get worse.     · You are not getting better after 1 week. Where can you learn more? Go to http://will-randi.info/. Enter D054 in the search box to learn more about \"Influenza (Flu): Care Instructions. \"  Current as of: June 9, 2019  Content Version: 12.2  © 3575-1397 [employer]. Care instructions adapted under license by [employer] (which disclaims liability or warranty for this information). If you have questions about a medical condition or this instruction, always ask your healthcare professional. [address] any warranty or liability for your use of this information.

## 2020-02-24 NOTE — LETTER
[address] 
[facility] EMERGENCY DEPT 
[address] [facility] Fidencio Mahmood [address] [phone] Work/School Note Date: 2/24/2020 To Whom It May concern: 
 
Alisson Fang was seen and treated today in the emergency room by the following provider(s): 
Attending Provider: Noelle Garduno MD 
Physician Assistant: SILVESTRE Lomeli. Alisson Fang may return to work on 2/29/2020. Sincerely, 
 
 
 
 
Fiona Gandhi.  [place], [place]

## 2021-01-01 ENCOUNTER — APPOINTMENT (OUTPATIENT)
Dept: NUCLEAR MEDICINE | Age: 46
DRG: 283 | End: 2021-01-01
Attending: HOSPITALIST

## 2021-01-01 ENCOUNTER — APPOINTMENT (OUTPATIENT)
Dept: GENERAL RADIOLOGY | Age: 46
DRG: 283 | End: 2021-01-01
Attending: EMERGENCY MEDICINE

## 2021-01-01 ENCOUNTER — ANESTHESIA (OUTPATIENT)
Dept: SURGERY | Age: 46
End: 2021-01-01
Payer: COMMERCIAL

## 2021-01-01 ENCOUNTER — HOSPITAL ENCOUNTER (EMERGENCY)
Age: 46
Discharge: HOME | End: 2021-02-10
Attending: EMERGENCY MEDICINE

## 2021-01-01 ENCOUNTER — APPOINTMENT (OUTPATIENT)
Dept: GENERAL RADIOLOGY | Age: 46
DRG: 283 | End: 2021-01-01
Attending: NURSE PRACTITIONER

## 2021-01-01 ENCOUNTER — HOSPITAL ENCOUNTER (OUTPATIENT)
Age: 46
End: 2021-06-25
Attending: ANESTHESIOLOGY | Admitting: ANESTHESIOLOGY
Payer: COMMERCIAL

## 2021-01-01 ENCOUNTER — APPOINTMENT (OUTPATIENT)
Dept: GENERAL RADIOLOGY | Age: 46
DRG: 283 | End: 2021-01-01
Attending: INTERNAL MEDICINE

## 2021-01-01 ENCOUNTER — APPOINTMENT (OUTPATIENT)
Dept: NON INVASIVE DIAGNOSTICS | Age: 46
DRG: 283 | End: 2021-01-01
Attending: INTERNAL MEDICINE

## 2021-01-01 ENCOUNTER — APPOINTMENT (OUTPATIENT)
Dept: GENERAL RADIOLOGY | Age: 46
End: 2021-01-01
Attending: HOSPITALIST
Payer: COMMERCIAL

## 2021-01-01 ENCOUNTER — ANESTHESIA EVENT (OUTPATIENT)
Dept: SURGERY | Age: 46
End: 2021-01-01
Payer: COMMERCIAL

## 2021-01-01 ENCOUNTER — APPOINTMENT (OUTPATIENT)
Dept: CT IMAGING | Age: 46
DRG: 283 | End: 2021-01-01
Attending: EMERGENCY MEDICINE

## 2021-01-01 ENCOUNTER — HOSPITAL ENCOUNTER (INPATIENT)
Age: 46
LOS: 1 days | DRG: 283 | End: 2021-06-24
Attending: EMERGENCY MEDICINE | Admitting: ANESTHESIOLOGY

## 2021-01-01 VITALS
TEMPERATURE: 98.3 F | HEIGHT: 65 IN | BODY MASS INDEX: 17.26 KG/M2 | HEART RATE: 57 BPM | RESPIRATION RATE: 20 BRPM | DIASTOLIC BLOOD PRESSURE: 100 MMHG | OXYGEN SATURATION: 97 % | WEIGHT: 103.62 LBS | SYSTOLIC BLOOD PRESSURE: 126 MMHG

## 2021-01-01 VITALS
WEIGHT: 112.21 LBS | DIASTOLIC BLOOD PRESSURE: 66 MMHG | HEART RATE: 98 BPM | SYSTOLIC BLOOD PRESSURE: 105 MMHG | OXYGEN SATURATION: 100 % | TEMPERATURE: 97.8 F | HEIGHT: 67 IN | RESPIRATION RATE: 32 BRPM | BODY MASS INDEX: 17.61 KG/M2

## 2021-01-01 VITALS
TEMPERATURE: 101 F | WEIGHT: 123.68 LBS | RESPIRATION RATE: 32 BRPM | SYSTOLIC BLOOD PRESSURE: 100 MMHG | BODY MASS INDEX: 20.61 KG/M2 | DIASTOLIC BLOOD PRESSURE: 63 MMHG | HEIGHT: 65 IN | HEART RATE: 105 BPM | OXYGEN SATURATION: 100 %

## 2021-01-01 DIAGNOSIS — I46.9 CARDIAC ARREST (HCC): Primary | ICD-10-CM

## 2021-01-01 DIAGNOSIS — I49.9 MALIGNANT VENTRICULAR ARRHYTHMIAS: ICD-10-CM

## 2021-01-01 DIAGNOSIS — G93.1 ANOXIC BRAIN INJURY (HCC): ICD-10-CM

## 2021-01-01 DIAGNOSIS — H81.399 PERIPHERAL VERTIGO, UNSPECIFIED LATERALITY: ICD-10-CM

## 2021-01-01 DIAGNOSIS — E87.6 HYPOKALEMIA: Primary | ICD-10-CM

## 2021-01-01 LAB
ABO + RH BLD: NORMAL
ALBUMIN SERPL-MCNC: 1.7 G/DL (ref 3.5–5)
ALBUMIN SERPL-MCNC: 1.9 G/DL (ref 3.5–5)
ALBUMIN SERPL-MCNC: 2.1 G/DL (ref 3.5–5)
ALBUMIN SERPL-MCNC: 2.2 G/DL (ref 3.5–5)
ALBUMIN SERPL-MCNC: 2.5 G/DL (ref 3.5–5)
ALBUMIN/GLOB SERPL: 0.7 {RATIO} (ref 1.1–2.2)
ALBUMIN/GLOB SERPL: 0.7 {RATIO} (ref 1.1–2.2)
ALBUMIN/GLOB SERPL: 0.8 {RATIO} (ref 1.1–2.2)
ALBUMIN/GLOB SERPL: 0.9 {RATIO} (ref 1.1–2.2)
ALP SERPL-CCNC: 165 U/L (ref 45–117)
ALP SERPL-CCNC: 64 U/L (ref 45–117)
ALP SERPL-CCNC: 75 U/L (ref 45–117)
ALP SERPL-CCNC: 97 U/L (ref 45–117)
ALT SERPL-CCNC: 1294 U/L (ref 12–78)
ALT SERPL-CCNC: 147 U/L (ref 12–78)
ALT SERPL-CCNC: 2199 U/L (ref 12–78)
ALT SERPL-CCNC: 95 U/L (ref 12–78)
AMORPH CRY URNS QL MICRO: ABNORMAL
AMPHET UR QL SCN: NEGATIVE
AMYLASE SERPL-CCNC: 85 U/L (ref 25–115)
ANION GAP SERPL CALC-SCNC: 10 MMOL/L (ref 5–15)
ANION GAP SERPL CALC-SCNC: 12 MMOL/L (ref 5–15)
ANION GAP SERPL CALC-SCNC: 17 MMOL/L (ref 5–15)
ANION GAP SERPL CALC-SCNC: 4 MMOL/L (ref 5–15)
ANION GAP SERPL CALC-SCNC: 5 MMOL/L (ref 5–15)
ANION GAP SERPL CALC-SCNC: 5 MMOL/L (ref 5–15)
ANION GAP SERPL CALC-SCNC: 9 MMOL/L (ref 5–15)
APAP SERPL-MCNC: 6 UG/ML (ref 10–30)
APPEARANCE UR: ABNORMAL
APPEARANCE UR: ABNORMAL
APPEARANCE UR: CLEAR
APTT PPP: 36.2 SEC (ref 22.1–31)
ARTERIAL PATENCY WRIST A: ABNORMAL
ARTERIAL PATENCY WRIST A: YES
AST SERPL-CCNC: 393 U/L (ref 15–37)
AST SERPL-CCNC: 404 U/L (ref 15–37)
AST SERPL-CCNC: >2000 U/L (ref 15–37)
AST SERPL-CCNC: >2000 U/L (ref 15–37)
ATRIAL RATE: 104 BPM
ATRIAL RATE: 51 BPM
ATRIAL RATE: 74 BPM
B PERT DNA SPEC QL NAA+PROBE: NOT DETECTED
BACTERIA SPEC CULT: NORMAL
BACTERIA URNS QL MICRO: NEGATIVE /HPF
BACTERIA URNS QL MICRO: NEGATIVE /HPF
BARBITURATES UR QL SCN: NEGATIVE
BASE DEFICIT BLD-SCNC: 10.4 MMOL/L
BASE DEFICIT BLDA-SCNC: 0.9 MMOL/L
BASE DEFICIT BLDA-SCNC: 11.2 MMOL/L
BASE DEFICIT BLDA-SCNC: 5.5 MMOL/L
BASE EXCESS BLDA CALC-SCNC: 1.6 MMOL/L
BASOPHILS # BLD: 0 K/UL (ref 0–0.1)
BASOPHILS NFR BLD: 0 % (ref 0–1)
BDY SITE: ABNORMAL
BDY SITE: NORMAL
BENZODIAZ UR QL: NEGATIVE
BILIRUB DIRECT SERPL-MCNC: 0.2 MG/DL (ref 0–0.2)
BILIRUB DIRECT SERPL-MCNC: 0.3 MG/DL (ref 0–0.2)
BILIRUB DIRECT SERPL-MCNC: 0.7 MG/DL (ref 0–0.2)
BILIRUB SERPL-MCNC: 0.6 MG/DL (ref 0.2–1)
BILIRUB SERPL-MCNC: 0.6 MG/DL (ref 0.2–1)
BILIRUB SERPL-MCNC: 0.8 MG/DL (ref 0.2–1)
BILIRUB SERPL-MCNC: 1.1 MG/DL (ref 0.2–1)
BILIRUB UR QL: NEGATIVE
BLOOD GROUP ANTIBODIES SERPL: NORMAL
BORDETELLA PARAPERTUSSIS PCR, BORPAR: NOT DETECTED
BUN SERPL-MCNC: 12 MG/DL (ref 6–20)
BUN SERPL-MCNC: 14 MG/DL (ref 6–20)
BUN SERPL-MCNC: 22 MG/DL (ref 6–20)
BUN SERPL-MCNC: 27 MG/DL (ref 6–20)
BUN SERPL-MCNC: 3 MG/DL (ref 6–20)
BUN SERPL-MCNC: 33 MG/DL (ref 6–20)
BUN SERPL-MCNC: 6 MG/DL (ref 6–20)
BUN/CREAT SERPL: 10 (ref 12–20)
BUN/CREAT SERPL: 10 (ref 12–20)
BUN/CREAT SERPL: 11 (ref 12–20)
BUN/CREAT SERPL: 11 (ref 12–20)
BUN/CREAT SERPL: 12 (ref 12–20)
BUN/CREAT SERPL: 5 (ref 12–20)
BUN/CREAT SERPL: 6 (ref 12–20)
C PNEUM DNA SPEC QL NAA+PROBE: NOT DETECTED
CA-I BLD-MCNC: 1.07 MMOL/L (ref 1.12–1.32)
CALCIUM SERPL-MCNC: 6.9 MG/DL (ref 8.5–10.1)
CALCIUM SERPL-MCNC: 8.1 MG/DL (ref 8.5–10.1)
CALCIUM SERPL-MCNC: 8.5 MG/DL (ref 8.5–10.1)
CALCIUM SERPL-MCNC: 8.5 MG/DL (ref 8.5–10.1)
CALCIUM SERPL-MCNC: 8.9 MG/DL (ref 8.5–10.1)
CALCIUM SERPL-MCNC: 9 MG/DL (ref 8.5–10.1)
CALCIUM SERPL-MCNC: 9.3 MG/DL (ref 8.5–10.1)
CALCULATED P AXIS, ECG09: 55 DEGREES
CALCULATED R AXIS, ECG10: -88 DEGREES
CALCULATED R AXIS, ECG10: -92 DEGREES
CALCULATED R AXIS, ECG10: 0 DEGREES
CALCULATED T AXIS, ECG11: -94 DEGREES
CALCULATED T AXIS, ECG11: 135 DEGREES
CALCULATED T AXIS, ECG11: 87 DEGREES
CANNABINOIDS UR QL SCN: POSITIVE
CHLORIDE BLD-SCNC: 101 MMOL/L (ref 100–108)
CHLORIDE SERPL-SCNC: 103 MMOL/L (ref 97–108)
CHLORIDE SERPL-SCNC: 106 MMOL/L (ref 97–108)
CHLORIDE SERPL-SCNC: 106 MMOL/L (ref 97–108)
CHLORIDE SERPL-SCNC: 107 MMOL/L (ref 97–108)
CHLORIDE SERPL-SCNC: 111 MMOL/L (ref 97–108)
CHLORIDE SERPL-SCNC: 111 MMOL/L (ref 97–108)
CHLORIDE SERPL-SCNC: 113 MMOL/L (ref 97–108)
CO2 BLD-SCNC: 25 MMOL/L (ref 19–24)
CO2 SERPL-SCNC: 17 MMOL/L (ref 21–32)
CO2 SERPL-SCNC: 23 MMOL/L (ref 21–32)
CO2 SERPL-SCNC: 27 MMOL/L (ref 21–32)
CO2 SERPL-SCNC: 29 MMOL/L (ref 21–32)
CO2 SERPL-SCNC: 29 MMOL/L (ref 21–32)
CO2 SERPL-SCNC: 33 MMOL/L (ref 21–32)
CO2 SERPL-SCNC: 33 MMOL/L (ref 21–32)
COCAINE UR QL SCN: NEGATIVE
COLOR UR: ABNORMAL
COLOR UR: ABNORMAL
COLOR UR: NORMAL
COMMENT, HOLDF: NORMAL
CREAT SERPL-MCNC: 0.62 MG/DL (ref 0.55–1.02)
CREAT SERPL-MCNC: 1.09 MG/DL (ref 0.55–1.02)
CREAT SERPL-MCNC: 1.16 MG/DL (ref 0.55–1.02)
CREAT SERPL-MCNC: 1.38 MG/DL (ref 0.55–1.02)
CREAT SERPL-MCNC: 1.79 MG/DL (ref 0.55–1.02)
CREAT SERPL-MCNC: 2.44 MG/DL (ref 0.55–1.02)
CREAT SERPL-MCNC: 2.89 MG/DL (ref 0.55–1.02)
CREAT UR-MCNC: 1 MG/DL (ref 0.6–1.3)
DIAGNOSIS, 93000: NORMAL
DIFFERENTIAL METHOD BLD: ABNORMAL
DIFFERENTIAL METHOD BLD: NORMAL
DRUG SCRN COMMENT,DRGCM: ABNORMAL
ECHO AO ROOT DIAM: 2.92 CM
ECHO AV AREA PEAK VELOCITY: 1.63 CM2
ECHO AV AREA/BSA PEAK VELOCITY: 1 CM2/M2
ECHO AV PEAK GRADIENT: 3.96 MMHG
ECHO AV PEAK VELOCITY: 92.54 CM/S
ECHO EST RA PRESSURE: 10 MMHG
ECHO LV E' LATERAL VELOCITY: 4.95 CM/S
ECHO LV E' SEPTAL VELOCITY: 7.48 CM/S
ECHO LV INTERNAL DIMENSION DIASTOLIC: 4.37 CM (ref 3.9–5.3)
ECHO LV INTERNAL DIMENSION SYSTOLIC: 3.55 CM
ECHO LV IVSD: 0.84 CM (ref 0.6–0.9)
ECHO LV MASS 2D: 91 G (ref 67–162)
ECHO LV MASS INDEX 2D: 56.5 G/M2 (ref 43–95)
ECHO LV POSTERIOR WALL DIASTOLIC: 0.56 CM (ref 0.6–0.9)
ECHO LVOT DIAM: 2.14 CM
ECHO LVOT PEAK GRADIENT: 0.7 MMHG
ECHO LVOT PEAK VELOCITY: 41.87 CM/S
ECHO MV AREA PHT: 5.17 CM2
ECHO MV E DECELERATION TIME (DT): 137.21 MS
ECHO MV E VELOCITY: 62.26 CM/S
ECHO MV E/E' LATERAL: 12.58
ECHO MV E/E' RATIO (AVERAGED): 10.45
ECHO MV E/E' SEPTAL: 8.32
ECHO MV PRESSURE HALF TIME (PHT): 42.58 MS
ECHO PV MAX VELOCITY: 61.26 CM/S
ECHO PV PEAK INSTANTANEOUS GRADIENT SYSTOLIC: 1.5 MMHG
ECHO RIGHT VENTRICULAR SYSTOLIC PRESSURE (RVSP): 20.41 MMHG
ECHO TV REGURGITANT MAX VELOCITY: 160.9 CM/S
ECHO TV REGURGITANT MAX VELOCITY: 288.96 CM/S
ECHO TV REGURGITANT PEAK GRADIENT: 10.41 MMHG
EOSINOPHIL # BLD: 0 K/UL (ref 0–0.4)
EOSINOPHIL # BLD: 0.1 K/UL (ref 0–0.4)
EOSINOPHIL # BLD: 0.3 K/UL (ref 0–0.4)
EOSINOPHIL NFR BLD: 0 % (ref 0–7)
EOSINOPHIL NFR BLD: 1 % (ref 0–7)
EOSINOPHIL NFR BLD: 1 % (ref 0–7)
EPITH CASTS URNS QL MICRO: ABNORMAL /LPF
EPITH CASTS URNS QL MICRO: ABNORMAL /LPF
ERYTHROCYTE [DISTWIDTH] IN BLOOD BY AUTOMATED COUNT: 13.2 % (ref 11.5–14.5)
ERYTHROCYTE [DISTWIDTH] IN BLOOD BY AUTOMATED COUNT: 13.4 % (ref 11.5–14.5)
ERYTHROCYTE [DISTWIDTH] IN BLOOD BY AUTOMATED COUNT: 13.8 % (ref 11.5–14.5)
ERYTHROCYTE [DISTWIDTH] IN BLOOD BY AUTOMATED COUNT: 13.9 % (ref 11.5–14.5)
ERYTHROCYTE [DISTWIDTH] IN BLOOD BY AUTOMATED COUNT: 14 % (ref 11.5–14.5)
EST. AVERAGE GLUCOSE BLD GHB EST-MCNC: 108 MG/DL
ETHANOL SERPL-MCNC: <10 MG/DL
FIBRINOGEN PPP-MCNC: 361 MG/DL (ref 200–475)
FIBRINOGEN PPP-MCNC: 381 MG/DL (ref 200–475)
FIO2 ON VENT: 50 %
FLUAV H1 2009 PAND RNA SPEC QL NAA+PROBE: NOT DETECTED
FLUAV H1 RNA SPEC QL NAA+PROBE: NOT DETECTED
FLUAV H3 RNA SPEC QL NAA+PROBE: NOT DETECTED
FLUAV SUBTYP SPEC NAA+PROBE: NOT DETECTED
FLUBV RNA SPEC QL NAA+PROBE: NOT DETECTED
GAS FLOW.O2 SETTING OXYMISER: 28 L/MIN
GGT SERPL-CCNC: 57 U/L (ref 5–55)
GLOBULIN SER CALC-MCNC: 2.3 G/DL (ref 2–4)
GLOBULIN SER CALC-MCNC: 2.6 G/DL (ref 2–4)
GLOBULIN SER CALC-MCNC: 2.7 G/DL (ref 2–4)
GLOBULIN SER CALC-MCNC: 2.8 G/DL (ref 2–4)
GLUCOSE BLD STRIP.AUTO-MCNC: 136 MG/DL (ref 65–117)
GLUCOSE BLD STRIP.AUTO-MCNC: 184 MG/DL (ref 65–117)
GLUCOSE BLD STRIP.AUTO-MCNC: 196 MG/DL (ref 65–117)
GLUCOSE BLD STRIP.AUTO-MCNC: 252 MG/DL (ref 65–117)
GLUCOSE BLD STRIP.AUTO-MCNC: 378 MG/DL (ref 65–117)
GLUCOSE BLD STRIP.AUTO-MCNC: 389 MG/DL (ref 74–106)
GLUCOSE BLD STRIP.AUTO-MCNC: 390 MG/DL (ref 65–117)
GLUCOSE BLD STRIP.AUTO-MCNC: 98 MG/DL (ref 65–117)
GLUCOSE SERPL-MCNC: 156 MG/DL (ref 65–100)
GLUCOSE SERPL-MCNC: 221 MG/DL (ref 65–100)
GLUCOSE SERPL-MCNC: 383 MG/DL (ref 65–100)
GLUCOSE SERPL-MCNC: 448 MG/DL (ref 65–100)
GLUCOSE SERPL-MCNC: 452 MG/DL (ref 65–100)
GLUCOSE SERPL-MCNC: 65 MG/DL (ref 65–100)
GLUCOSE SERPL-MCNC: 86 MG/DL (ref 65–100)
GLUCOSE UR STRIP.AUTO-MCNC: 100 MG/DL
GLUCOSE UR STRIP.AUTO-MCNC: NEGATIVE MG/DL
GLUCOSE UR STRIP.AUTO-MCNC: NEGATIVE MG/DL
GRAN CASTS URNS QL MICRO: ABNORMAL /LPF
HADV DNA SPEC QL NAA+PROBE: NOT DETECTED
HBA1C MFR BLD: 5.4 % (ref 4–5.6)
HCO3 BLDA-SCNC: 16 MMOL/L (ref 22–26)
HCO3 BLDA-SCNC: 22 MMOL/L (ref 22–26)
HCO3 BLDA-SCNC: 23 MMOL/L
HCO3 BLDA-SCNC: 26 MMOL/L (ref 22–26)
HCO3 BLDA-SCNC: 28 MMOL/L (ref 22–26)
HCOV 229E RNA SPEC QL NAA+PROBE: NOT DETECTED
HCOV HKU1 RNA SPEC QL NAA+PROBE: NOT DETECTED
HCOV NL63 RNA SPEC QL NAA+PROBE: NOT DETECTED
HCOV OC43 RNA SPEC QL NAA+PROBE: NOT DETECTED
HCT VFR BLD AUTO: 35.4 % (ref 35–47)
HCT VFR BLD AUTO: 38.4 % (ref 35–47)
HCT VFR BLD AUTO: 39.3 % (ref 35–47)
HCT VFR BLD AUTO: 42.2 % (ref 35–47)
HCT VFR BLD AUTO: 44 % (ref 35–47)
HGB BLD-MCNC: 11.8 G/DL (ref 11.5–16)
HGB BLD-MCNC: 12.5 G/DL (ref 11.5–16)
HGB BLD-MCNC: 12.9 G/DL (ref 11.5–16)
HGB BLD-MCNC: 13.9 G/DL (ref 11.5–16)
HGB BLD-MCNC: 14.5 G/DL (ref 11.5–16)
HGB UR QL STRIP: ABNORMAL
HGB UR QL STRIP: NEGATIVE
HGB UR QL STRIP: NEGATIVE
HMPV RNA SPEC QL NAA+PROBE: NOT DETECTED
HPIV1 RNA SPEC QL NAA+PROBE: NOT DETECTED
HPIV2 RNA SPEC QL NAA+PROBE: NOT DETECTED
HPIV3 RNA SPEC QL NAA+PROBE: NOT DETECTED
HPIV4 RNA SPEC QL NAA+PROBE: NOT DETECTED
HYALINE CASTS URNS QL MICRO: ABNORMAL /LPF (ref 0–5)
HYALINE CASTS URNS QL MICRO: ABNORMAL /LPF (ref 0–5)
IMM GRANULOCYTES # BLD AUTO: 0 K/UL (ref 0–0.04)
IMM GRANULOCYTES # BLD AUTO: 0.3 K/UL (ref 0–0.04)
IMM GRANULOCYTES NFR BLD AUTO: 0 % (ref 0–0.5)
IMM GRANULOCYTES NFR BLD AUTO: 1 % (ref 0–0.5)
INR PPP: 1.7 (ref 0.9–1.1)
INR PPP: 2.9 (ref 0.9–1.1)
INR PPP: 3 (ref 0.9–1.1)
KETONES UR QL STRIP.AUTO: ABNORMAL MG/DL
KETONES UR QL STRIP.AUTO: NEGATIVE MG/DL
KETONES UR QL STRIP.AUTO: NEGATIVE MG/DL
LACTATE BLD-SCNC: 10.79 MMOL/L (ref 0.4–2)
LACTATE SERPL-SCNC: 4.8 MMOL/L (ref 0.4–2)
LACTATE SERPL-SCNC: 4.9 MMOL/L (ref 0.4–2)
LEUKOCYTE ESTERASE UR QL STRIP.AUTO: ABNORMAL
LEUKOCYTE ESTERASE UR QL STRIP.AUTO: NEGATIVE
LEUKOCYTE ESTERASE UR QL STRIP.AUTO: NEGATIVE
LIPASE SERPL-CCNC: 19 U/L (ref 73–393)
LYMPHOCYTES # BLD: 2 K/UL (ref 0.8–3.5)
LYMPHOCYTES # BLD: 2.6 K/UL (ref 0.8–3.5)
LYMPHOCYTES # BLD: 2.8 K/UL (ref 0.8–3.5)
LYMPHOCYTES # BLD: 3.1 K/UL (ref 0.8–3.5)
LYMPHOCYTES # BLD: 4.2 K/UL (ref 0.8–3.5)
LYMPHOCYTES NFR BLD: 10 % (ref 12–49)
LYMPHOCYTES NFR BLD: 10 % (ref 12–49)
LYMPHOCYTES NFR BLD: 13 % (ref 12–49)
LYMPHOCYTES NFR BLD: 30 % (ref 12–49)
LYMPHOCYTES NFR BLD: 8 % (ref 12–49)
M PNEUMO DNA SPEC QL NAA+PROBE: NOT DETECTED
MAGNESIUM SERPL-MCNC: 1.8 MG/DL (ref 1.6–2.4)
MAGNESIUM SERPL-MCNC: 2.2 MG/DL (ref 1.6–2.4)
MAGNESIUM SERPL-MCNC: 2.3 MG/DL (ref 1.6–2.4)
MAGNESIUM SERPL-MCNC: 2.7 MG/DL (ref 1.6–2.4)
MCH RBC QN AUTO: 30.9 PG (ref 26–34)
MCH RBC QN AUTO: 31 PG (ref 26–34)
MCH RBC QN AUTO: 31.5 PG (ref 26–34)
MCH RBC QN AUTO: 31.7 PG (ref 26–34)
MCH RBC QN AUTO: 32 PG (ref 26–34)
MCHC RBC AUTO-ENTMCNC: 31.8 G/DL (ref 30–36.5)
MCHC RBC AUTO-ENTMCNC: 32.9 G/DL (ref 30–36.5)
MCHC RBC AUTO-ENTMCNC: 33 G/DL (ref 30–36.5)
MCHC RBC AUTO-ENTMCNC: 33.3 G/DL (ref 30–36.5)
MCHC RBC AUTO-ENTMCNC: 33.6 G/DL (ref 30–36.5)
MCV RBC AUTO: 91.9 FL (ref 80–99)
MCV RBC AUTO: 95.2 FL (ref 80–99)
MCV RBC AUTO: 95.4 FL (ref 80–99)
MCV RBC AUTO: 97 FL (ref 80–99)
MCV RBC AUTO: 97.5 FL (ref 80–99)
METAMYELOCYTES NFR BLD MANUAL: 1 %
METHADONE UR QL: NEGATIVE
MONOCYTES # BLD: 0.3 K/UL (ref 0–1)
MONOCYTES # BLD: 0.6 K/UL (ref 0–1)
MONOCYTES # BLD: 0.8 K/UL (ref 0–1)
MONOCYTES # BLD: 1 K/UL (ref 0–1)
MONOCYTES # BLD: 2.8 K/UL (ref 0–1)
MONOCYTES NFR BLD: 1 % (ref 5–13)
MONOCYTES NFR BLD: 2 % (ref 5–13)
MONOCYTES NFR BLD: 4 % (ref 5–13)
MONOCYTES NFR BLD: 8 % (ref 5–13)
MONOCYTES NFR BLD: 9 % (ref 5–13)
MYELOCYTES NFR BLD MANUAL: 1 %
MYELOCYTES NFR BLD MANUAL: 1 %
NEUTS BAND NFR BLD MANUAL: 1 %
NEUTS SEG # BLD: 22.3 K/UL (ref 1.8–8)
NEUTS SEG # BLD: 22.5 K/UL (ref 1.8–8)
NEUTS SEG # BLD: 25.1 K/UL (ref 1.8–8)
NEUTS SEG # BLD: 26.9 K/UL (ref 1.8–8)
NEUTS SEG # BLD: 5.5 K/UL (ref 1.8–8)
NEUTS SEG NFR BLD: 61 % (ref 32–75)
NEUTS SEG NFR BLD: 81 % (ref 32–75)
NEUTS SEG NFR BLD: 83 % (ref 32–75)
NEUTS SEG NFR BLD: 84 % (ref 32–75)
NEUTS SEG NFR BLD: 89 % (ref 32–75)
NITRITE UR QL STRIP.AUTO: NEGATIVE
NRBC # BLD: 0 K/UL (ref 0–0.01)
NRBC # BLD: 0.02 K/UL (ref 0–0.01)
NRBC BLD-RTO: 0 PER 100 WBC
NRBC BLD-RTO: 0.1 PER 100 WBC
OPIATES UR QL: NEGATIVE
OTHER,OTHU: ABNORMAL
P-R INTERVAL, ECG05: 226 MS
P-R INTERVAL, ECG05: 364 MS
PCO2 BLDA: 38 MMHG (ref 35–45)
PCO2 BLDA: 42 MMHG (ref 35–45)
PCO2 BLDA: 48 MMHG (ref 35–45)
PCO2 BLDA: 63 MMHG (ref 35–45)
PCO2 BLDV: 96.6 MMHG (ref 41–51)
PCP UR QL: NEGATIVE
PEEP RESPIRATORY: 5 CM[H2O]
PH BLDA: 7.23 [PH] (ref 7.35–7.45)
PH BLDA: 7.26 [PH] (ref 7.35–7.45)
PH BLDA: 7.27 [PH] (ref 7.35–7.45)
PH BLDA: 7.41 [PH] (ref 7.35–7.45)
PH BLDV: 6.98 [PH] (ref 7.32–7.42)
PH UR STRIP: 5 [PH] (ref 5–8)
PH UR STRIP: 6.5 [PH] (ref 5–8)
PH UR STRIP: 6.5 [PH] (ref 5–8)
PHOSPHATE SERPL-MCNC: 1.4 MG/DL (ref 2.6–4.7)
PHOSPHATE SERPL-MCNC: 6.7 MG/DL (ref 2.6–4.7)
PLATELET # BLD AUTO: 116 K/UL (ref 150–400)
PLATELET # BLD AUTO: 136 K/UL (ref 150–400)
PLATELET # BLD AUTO: 196 K/UL (ref 150–400)
PLATELET # BLD AUTO: 224 K/UL (ref 150–400)
PLATELET # BLD AUTO: 245 K/UL (ref 150–400)
PLATELET COMMENTS,PCOM: ABNORMAL
PLATELET COMMENTS,PCOM: ABNORMAL
PMV BLD AUTO: 10.7 FL (ref 8.9–12.9)
PMV BLD AUTO: 11.3 FL (ref 8.9–12.9)
PMV BLD AUTO: 12 FL (ref 8.9–12.9)
PMV BLD AUTO: 12.4 FL (ref 8.9–12.9)
PMV BLD AUTO: 12.9 FL (ref 8.9–12.9)
PO2 BLDA: 348 MMHG (ref 80–100)
PO2 BLDA: 60 MMHG (ref 80–100)
PO2 BLDA: 86 MMHG (ref 80–100)
PO2 BLDA: 99 MMHG (ref 80–100)
PO2 BLDV: 53 MMHG (ref 25–40)
POTASSIUM BLD-SCNC: 2.1 MMOL/L (ref 3.5–5.5)
POTASSIUM SERPL-SCNC: 2.2 MMOL/L (ref 3.5–5.1)
POTASSIUM SERPL-SCNC: 2.3 MMOL/L (ref 3.5–5.1)
POTASSIUM SERPL-SCNC: 2.4 MMOL/L (ref 3.5–5.1)
POTASSIUM SERPL-SCNC: 2.8 MMOL/L (ref 3.5–5.1)
POTASSIUM SERPL-SCNC: 3.4 MMOL/L (ref 3.5–5.1)
POTASSIUM SERPL-SCNC: 4.4 MMOL/L (ref 3.5–5.1)
POTASSIUM SERPL-SCNC: 5 MMOL/L (ref 3.5–5.1)
PROCALCITONIN SERPL-MCNC: 0.44 NG/ML
PROT SERPL-MCNC: 4 G/DL (ref 6.4–8.2)
PROT SERPL-MCNC: 4.5 G/DL (ref 6.4–8.2)
PROT SERPL-MCNC: 5 G/DL (ref 6.4–8.2)
PROT SERPL-MCNC: 5.2 G/DL (ref 6.4–8.2)
PROT UR STRIP-MCNC: 100 MG/DL
PROT UR STRIP-MCNC: NEGATIVE MG/DL
PROT UR STRIP-MCNC: NEGATIVE MG/DL
PROTHROMBIN TIME: 17.2 SEC (ref 9–11.1)
PROTHROMBIN TIME: 28.6 SEC (ref 9–11.1)
PROTHROMBIN TIME: 30.3 SEC (ref 9–11.1)
Q-T INTERVAL, ECG07: 334 MS
Q-T INTERVAL, ECG07: 384 MS
Q-T INTERVAL, ECG07: 484 MS
QRS DURATION, ECG06: 150 MS
QRS DURATION, ECG06: 154 MS
QRS DURATION, ECG06: 26 MS
QTC CALCULATION (BEZET), ECG08: 408 MS
QTC CALCULATION (BEZET), ECG08: 446 MS
QTC CALCULATION (BEZET), ECG08: 556 MS
RBC # BLD AUTO: 3.72 M/UL (ref 3.8–5.2)
RBC # BLD AUTO: 4.03 M/UL (ref 3.8–5.2)
RBC # BLD AUTO: 4.18 M/UL (ref 3.8–5.2)
RBC # BLD AUTO: 4.35 M/UL (ref 3.8–5.2)
RBC # BLD AUTO: 4.61 M/UL (ref 3.8–5.2)
RBC #/AREA URNS HPF: ABNORMAL /HPF (ref 0–5)
RBC #/AREA URNS HPF: ABNORMAL /HPF (ref 0–5)
RBC MORPH BLD: ABNORMAL
RSV RNA SPEC QL NAA+PROBE: NOT DETECTED
RV+EV RNA SPEC QL NAA+PROBE: NOT DETECTED
SALICYLATES SERPL-MCNC: 2.2 MG/DL (ref 2.8–20)
SAMPLES BEING HELD,HOLD: NORMAL
SAO2 % BLD: 100 % (ref 92–97)
SAO2 % BLD: 87 % (ref 92–97)
SAO2 % BLD: 97 % (ref 92–97)
SAO2 % BLD: 97 % (ref 92–97)
SAO2% DEVICE SAO2% SENSOR NAME: ABNORMAL
SAO2% DEVICE SAO2% SENSOR NAME: NORMAL
SARS-COV-2 PCR, COVPCR: NOT DETECTED
SERVICE CMNT-IMP: ABNORMAL
SERVICE CMNT-IMP: NORMAL
SERVICE CMNT-IMP: NORMAL
SODIUM BLD-SCNC: 142 MMOL/L (ref 136–145)
SODIUM SERPL-SCNC: 141 MMOL/L (ref 136–145)
SODIUM SERPL-SCNC: 141 MMOL/L (ref 136–145)
SODIUM SERPL-SCNC: 143 MMOL/L (ref 136–145)
SODIUM SERPL-SCNC: 145 MMOL/L (ref 136–145)
SODIUM SERPL-SCNC: 145 MMOL/L (ref 136–145)
SODIUM SERPL-SCNC: 147 MMOL/L (ref 136–145)
SODIUM SERPL-SCNC: 148 MMOL/L (ref 136–145)
SP GR UR REFRACTOMETRY: 1.01 (ref 1–1.03)
SP GR UR REFRACTOMETRY: 1.01 (ref 1–1.03)
SP GR UR REFRACTOMETRY: 1.02 (ref 1–1.03)
SPECIMEN EXP DATE BLD: NORMAL
SPECIMEN SITE: ABNORMAL
SPECIMEN SITE: NORMAL
THERAPEUTIC RANGE,PTTT: ABNORMAL SECS (ref 58–77)
TROPONIN I BLD-MCNC: 0.07 NG/ML (ref 0–0.08)
TROPONIN I SERPL-MCNC: 88.4 NG/ML
TROPONIN I SERPL-MCNC: <0.05 NG/ML
UA: UC IF INDICATED,UAUC: ABNORMAL
UROBILINOGEN UR QL STRIP.AUTO: 0.2 EU/DL (ref 0.2–1)
UROBILINOGEN UR QL STRIP.AUTO: 0.2 EU/DL (ref 0.2–1)
UROBILINOGEN UR QL STRIP.AUTO: 1 EU/DL (ref 0.2–1)
VENTILATION MODE VENT: NORMAL
VENTRICULAR RATE, ECG03: 126 BPM
VENTRICULAR RATE, ECG03: 51 BPM
VENTRICULAR RATE, ECG03: 90 BPM
VT SETTING VENT: 400 ML
WBC # BLD AUTO: 25.3 K/UL (ref 3.6–11)
WBC # BLD AUTO: 26.2 K/UL (ref 3.6–11)
WBC # BLD AUTO: 31 K/UL (ref 3.6–11)
WBC # BLD AUTO: 32.3 K/UL (ref 3.6–11)
WBC # BLD AUTO: 9.2 K/UL (ref 3.6–11)
WBC URNS QL MICRO: ABNORMAL /HPF (ref 0–4)
WBC URNS QL MICRO: ABNORMAL /HPF (ref 0–4)

## 2021-01-01 PROCEDURE — 74011000258 HC RX REV CODE- 258: Performed by: ANESTHESIOLOGY

## 2021-01-01 PROCEDURE — 87040 BLOOD CULTURE FOR BACTERIA: CPT

## 2021-01-01 PROCEDURE — 74011636637 HC RX REV CODE- 636/637: Performed by: INTERNAL MEDICINE

## 2021-01-01 PROCEDURE — 88305 TISSUE EXAM BY PATHOLOGIST: CPT

## 2021-01-01 PROCEDURE — 71045 X-RAY EXAM CHEST 1 VIEW: CPT

## 2021-01-01 PROCEDURE — 94002 VENT MGMT INPAT INIT DAY: CPT

## 2021-01-01 PROCEDURE — 81003 URINALYSIS AUTO W/O SCOPE: CPT

## 2021-01-01 PROCEDURE — 74011000258 HC RX REV CODE- 258: Performed by: INTERNAL MEDICINE

## 2021-01-01 PROCEDURE — 74011250636 HC RX REV CODE- 250/636: Performed by: INTERNAL MEDICINE

## 2021-01-01 PROCEDURE — 86901 BLOOD TYPING SEROLOGIC RH(D): CPT

## 2021-01-01 PROCEDURE — 77030008683 HC TU ET CUF COVD -A

## 2021-01-01 PROCEDURE — 74011000250 HC RX REV CODE- 250: Performed by: ANESTHESIOLOGY

## 2021-01-01 PROCEDURE — 83690 ASSAY OF LIPASE: CPT

## 2021-01-01 PROCEDURE — 36415 COLL VENOUS BLD VENIPUNCTURE: CPT

## 2021-01-01 PROCEDURE — 2709999900 HC NON-CHARGEABLE SUPPLY

## 2021-01-01 PROCEDURE — 74011250636 HC RX REV CODE- 250/636: Performed by: NURSE PRACTITIONER

## 2021-01-01 PROCEDURE — 80048 BASIC METABOLIC PNL TOTAL CA: CPT

## 2021-01-01 PROCEDURE — 82962 GLUCOSE BLOOD TEST: CPT

## 2021-01-01 PROCEDURE — 82803 BLOOD GASES ANY COMBINATION: CPT

## 2021-01-01 PROCEDURE — 77030013797 HC KT TRNSDUC PRSSR EDWD -A

## 2021-01-01 PROCEDURE — 74011000250 HC RX REV CODE- 250: Performed by: NURSE ANESTHETIST, CERTIFIED REGISTERED

## 2021-01-01 PROCEDURE — 84484 ASSAY OF TROPONIN QUANT: CPT

## 2021-01-01 PROCEDURE — 36600 WITHDRAWAL OF ARTERIAL BLOOD: CPT

## 2021-01-01 PROCEDURE — 94003 VENT MGMT INPAT SUBQ DAY: CPT

## 2021-01-01 PROCEDURE — 82150 ASSAY OF AMYLASE: CPT

## 2021-01-01 PROCEDURE — 65610000006 HC RM INTENSIVE CARE

## 2021-01-01 PROCEDURE — 85730 THROMBOPLASTIN TIME PARTIAL: CPT

## 2021-01-01 PROCEDURE — 84295 ASSAY OF SERUM SODIUM: CPT

## 2021-01-01 PROCEDURE — 82077 ASSAY SPEC XCP UR&BREATH IA: CPT

## 2021-01-01 PROCEDURE — 76060000038 HC ANESTHESIA 3.5 TO 4 HR

## 2021-01-01 PROCEDURE — 83036 HEMOGLOBIN GLYCOSYLATED A1C: CPT

## 2021-01-01 PROCEDURE — 74011250636 HC RX REV CODE- 250/636

## 2021-01-01 PROCEDURE — 85610 PROTHROMBIN TIME: CPT

## 2021-01-01 PROCEDURE — 77030005402 HC CATH RAD ART LN KT TELE -B

## 2021-01-01 PROCEDURE — 80076 HEPATIC FUNCTION PANEL: CPT

## 2021-01-01 PROCEDURE — 74011250637 HC RX REV CODE- 250/637: Performed by: INTERNAL MEDICINE

## 2021-01-01 PROCEDURE — 0BH17EZ INSERTION OF ENDOTRACHEAL AIRWAY INTO TRACHEA, VIA NATURAL OR ARTIFICIAL OPENING: ICD-10-PCS | Performed by: INTERNAL MEDICINE

## 2021-01-01 PROCEDURE — 87086 URINE CULTURE/COLONY COUNT: CPT

## 2021-01-01 PROCEDURE — C1751 CATH, INF, PER/CENT/MIDLINE: HCPCS

## 2021-01-01 PROCEDURE — 5A1945Z RESPIRATORY VENTILATION, 24-96 CONSECUTIVE HOURS: ICD-10-PCS | Performed by: INTERNAL MEDICINE

## 2021-01-01 PROCEDURE — 74011000250 HC RX REV CODE- 250

## 2021-01-01 PROCEDURE — 74018 RADEX ABDOMEN 1 VIEW: CPT

## 2021-01-01 PROCEDURE — 92950 HEART/LUNG RESUSCITATION CPR: CPT

## 2021-01-01 PROCEDURE — 93306 TTE W/DOPPLER COMPLETE: CPT

## 2021-01-01 PROCEDURE — 85025 COMPLETE CBC W/AUTO DIFF WBC: CPT

## 2021-01-01 PROCEDURE — 77030029488 HC ELECTRD PAD DEFIB ZOLL -B

## 2021-01-01 PROCEDURE — 74011250636 HC RX REV CODE- 250/636: Performed by: NURSE ANESTHETIST, CERTIFIED REGISTERED

## 2021-01-01 PROCEDURE — 81001 URINALYSIS AUTO W/SCOPE: CPT

## 2021-01-01 PROCEDURE — 74011636637 HC RX REV CODE- 636/637: Performed by: NURSE PRACTITIONER

## 2021-01-01 PROCEDURE — 74011250637 HC RX REV CODE- 250/637: Performed by: NURSE PRACTITIONER

## 2021-01-01 PROCEDURE — 99285 EMERGENCY DEPT VISIT HI MDM: CPT

## 2021-01-01 PROCEDURE — 74011000258 HC RX REV CODE- 258

## 2021-01-01 PROCEDURE — 74011250636 HC RX REV CODE- 250/636: Performed by: EMERGENCY MEDICINE

## 2021-01-01 PROCEDURE — 80053 COMPREHEN METABOLIC PANEL: CPT

## 2021-01-01 PROCEDURE — 84145 PROCALCITONIN (PCT): CPT

## 2021-01-01 PROCEDURE — 99291 CRITICAL CARE FIRST HOUR: CPT

## 2021-01-01 PROCEDURE — 74011250636 HC RX REV CODE- 250/636: Performed by: ANESTHESIOLOGY

## 2021-01-01 PROCEDURE — 84100 ASSAY OF PHOSPHORUS: CPT

## 2021-01-01 PROCEDURE — 95816 EEG AWAKE AND DROWSY: CPT | Performed by: PSYCHIATRY & NEUROLOGY

## 2021-01-01 PROCEDURE — 74011250637 HC RX REV CODE- 250/637: Performed by: EMERGENCY MEDICINE

## 2021-01-01 PROCEDURE — 83735 ASSAY OF MAGNESIUM: CPT

## 2021-01-01 PROCEDURE — 95816 EEG AWAKE AND DROWSY: CPT | Performed by: INTERNAL MEDICINE

## 2021-01-01 PROCEDURE — 70450 CT HEAD/BRAIN W/O DYE: CPT

## 2021-01-01 PROCEDURE — 93005 ELECTROCARDIOGRAM TRACING: CPT

## 2021-01-01 PROCEDURE — 83605 ASSAY OF LACTIC ACID: CPT

## 2021-01-01 PROCEDURE — 77030022017 HC DRSG HEMO QCLOT ZMED -A

## 2021-01-01 PROCEDURE — 77030019896 HC KT ARTERIAL LN TELE -B

## 2021-01-01 PROCEDURE — 88331 PATH CONSLTJ SURG 1 BLK 1SPC: CPT

## 2021-01-01 PROCEDURE — 96375 TX/PRO/DX INJ NEW DRUG ADDON: CPT

## 2021-01-01 PROCEDURE — 31500 INSERT EMERGENCY AIRWAY: CPT

## 2021-01-01 PROCEDURE — 96376 TX/PRO/DX INJ SAME DRUG ADON: CPT

## 2021-01-01 PROCEDURE — 80179 DRUG ASSAY SALICYLATE: CPT

## 2021-01-01 PROCEDURE — 0202U NFCT DS 22 TRGT SARS-COV-2: CPT

## 2021-01-01 PROCEDURE — 77030005401 HC CATH RAD ARRO -A

## 2021-01-01 PROCEDURE — 74011000250 HC RX REV CODE- 250: Performed by: INTERNAL MEDICINE

## 2021-01-01 PROCEDURE — 74011000250 HC RX REV CODE- 250: Performed by: NURSE PRACTITIONER

## 2021-01-01 PROCEDURE — 80143 DRUG ASSAY ACETAMINOPHEN: CPT

## 2021-01-01 PROCEDURE — 76010000134 HC OR TIME 3.5 TO 4 HR

## 2021-01-01 PROCEDURE — 02HV33Z INSERTION OF INFUSION DEVICE INTO SUPERIOR VENA CAVA, PERCUTANEOUS APPROACH: ICD-10-PCS | Performed by: INTERNAL MEDICINE

## 2021-01-01 PROCEDURE — 78601 BRAIN IMAGE W/FLOW < 4 VIEWS: CPT

## 2021-01-01 PROCEDURE — 82977 ASSAY OF GGT: CPT

## 2021-01-01 PROCEDURE — 96365 THER/PROPH/DIAG IV INF INIT: CPT

## 2021-01-01 PROCEDURE — 99221 1ST HOSP IP/OBS SF/LOW 40: CPT | Performed by: NURSE PRACTITIONER

## 2021-01-01 PROCEDURE — 74011250636 HC RX REV CODE- 250/636: Performed by: HOSPITALIST

## 2021-01-01 PROCEDURE — 96374 THER/PROPH/DIAG INJ IV PUSH: CPT

## 2021-01-01 PROCEDURE — 5A12012 PERFORMANCE OF CARDIAC OUTPUT, SINGLE, MANUAL: ICD-10-PCS | Performed by: INTERNAL MEDICINE

## 2021-01-01 PROCEDURE — 85384 FIBRINOGEN ACTIVITY: CPT

## 2021-01-01 PROCEDURE — 80069 RENAL FUNCTION PANEL: CPT

## 2021-01-01 PROCEDURE — 99231 SBSQ HOSP IP/OBS SF/LOW 25: CPT | Performed by: NURSE PRACTITIONER

## 2021-01-01 PROCEDURE — 99284 EMERGENCY DEPT VISIT MOD MDM: CPT

## 2021-01-01 PROCEDURE — 80307 DRUG TEST PRSMV CHEM ANLYZR: CPT

## 2021-01-01 PROCEDURE — 74011000250 HC RX REV CODE- 250: Performed by: EMERGENCY MEDICINE

## 2021-01-01 RX ORDER — MANNITOL 250 MG/ML
INJECTION, SOLUTION INTRAVENOUS AS NEEDED
Status: DISCONTINUED | OUTPATIENT
Start: 2021-01-01 | End: 2021-01-01 | Stop reason: HOSPADM

## 2021-01-01 RX ORDER — ONDANSETRON 2 MG/ML
4 INJECTION INTRAMUSCULAR; INTRAVENOUS
Status: DISCONTINUED | OUTPATIENT
Start: 2021-01-01 | End: 2021-01-01 | Stop reason: HOSPADM

## 2021-01-01 RX ORDER — SODIUM BICARBONATE IN D5W 150/1000ML
PLASTIC BAG, INJECTION (ML) INTRAVENOUS
Status: COMPLETED
Start: 2021-01-01 | End: 2021-01-01

## 2021-01-01 RX ORDER — POTASSIUM CHLORIDE 750 MG/1
20 TABLET, FILM COATED, EXTENDED RELEASE ORAL DAILY
Qty: 30 TAB | Refills: 0 | Status: SHIPPED | OUTPATIENT
Start: 2021-01-01

## 2021-01-01 RX ORDER — ONDANSETRON 4 MG/1
4 TABLET, ORALLY DISINTEGRATING ORAL
Status: DISCONTINUED | OUTPATIENT
Start: 2021-01-01 | End: 2021-01-01 | Stop reason: HOSPADM

## 2021-01-01 RX ORDER — SODIUM BICARBONATE 1 MEQ/ML
100 SYRINGE (ML) INTRAVENOUS ONCE
Status: COMPLETED | OUTPATIENT
Start: 2021-01-01 | End: 2021-01-01

## 2021-01-01 RX ORDER — EPINEPHRINE 0.1 MG/ML
INJECTION INTRACARDIAC; INTRAVENOUS
Status: DISPENSED
Start: 2021-01-01 | End: 2021-01-01

## 2021-01-01 RX ORDER — FAMOTIDINE 20 MG/1
20 TABLET, FILM COATED ORAL DAILY
Status: DISCONTINUED | OUTPATIENT
Start: 2021-01-01 | End: 2021-01-01

## 2021-01-01 RX ORDER — POTASSIUM CHLORIDE 750 MG/1
40 TABLET, FILM COATED, EXTENDED RELEASE ORAL
Status: COMPLETED | OUTPATIENT
Start: 2021-01-01 | End: 2021-01-01

## 2021-01-01 RX ORDER — VANCOMYCIN HYDROCHLORIDE
1250 ONCE
Status: COMPLETED | OUTPATIENT
Start: 2021-01-01 | End: 2021-01-01

## 2021-01-01 RX ORDER — PROPOFOL 10 MG/ML
0-50 VIAL (ML) INTRAVENOUS
Status: DISCONTINUED | OUTPATIENT
Start: 2021-01-01 | End: 2021-01-01

## 2021-01-01 RX ORDER — DEXTROSE 50 % IN WATER (D50W) INTRAVENOUS SYRINGE
12.5-25 AS NEEDED
Status: DISCONTINUED | OUTPATIENT
Start: 2021-01-01 | End: 2021-01-01 | Stop reason: HOSPADM

## 2021-01-01 RX ORDER — NOREPINEPHRINE BITARTRATE/D5W 8 MG/250ML
.5-5 PLASTIC BAG, INJECTION (ML) INTRAVENOUS
Status: DISCONTINUED | OUTPATIENT
Start: 2021-01-01 | End: 2021-01-01

## 2021-01-01 RX ORDER — SODIUM CHLORIDE, SODIUM LACTATE, POTASSIUM CHLORIDE, CALCIUM CHLORIDE 600; 310; 30; 20 MG/100ML; MG/100ML; MG/100ML; MG/100ML
INJECTION, SOLUTION INTRAVENOUS
Status: DISCONTINUED | OUTPATIENT
Start: 2021-01-01 | End: 2021-01-01 | Stop reason: HOSPADM

## 2021-01-01 RX ORDER — POTASSIUM CHLORIDE 29.8 MG/ML
20 INJECTION INTRAVENOUS
Status: DISPENSED | OUTPATIENT
Start: 2021-01-01 | End: 2021-01-01

## 2021-01-01 RX ORDER — ACETAMINOPHEN 325 MG/1
650 TABLET ORAL
Status: DISCONTINUED | OUTPATIENT
Start: 2021-01-01 | End: 2021-01-01 | Stop reason: HOSPADM

## 2021-01-01 RX ORDER — POTASSIUM CHLORIDE 29.8 MG/ML
20 INJECTION INTRAVENOUS
Status: COMPLETED | OUTPATIENT
Start: 2021-01-01 | End: 2021-01-01

## 2021-01-01 RX ORDER — SODIUM BICARBONATE 1 MEQ/ML
SYRINGE (ML) INTRAVENOUS
Status: COMPLETED | OUTPATIENT
Start: 2021-01-01 | End: 2021-01-01

## 2021-01-01 RX ORDER — DIAZEPAM 10 MG/2ML
2.5 INJECTION INTRAMUSCULAR
Status: COMPLETED | OUTPATIENT
Start: 2021-01-01 | End: 2021-01-01

## 2021-01-01 RX ORDER — MAGNESIUM SULFATE 100 %
4 CRYSTALS MISCELLANEOUS AS NEEDED
Status: DISCONTINUED | OUTPATIENT
Start: 2021-01-01 | End: 2021-01-01 | Stop reason: HOSPADM

## 2021-01-01 RX ORDER — FUROSEMIDE 10 MG/ML
INJECTION INTRAMUSCULAR; INTRAVENOUS AS NEEDED
Status: DISCONTINUED | OUTPATIENT
Start: 2021-01-01 | End: 2021-01-01 | Stop reason: HOSPADM

## 2021-01-01 RX ORDER — POLYETHYLENE GLYCOL 3350 17 G/17G
17 POWDER, FOR SOLUTION ORAL DAILY PRN
Status: DISCONTINUED | OUTPATIENT
Start: 2021-01-01 | End: 2021-01-01 | Stop reason: HOSPADM

## 2021-01-01 RX ORDER — ROCURONIUM BROMIDE 10 MG/ML
INJECTION, SOLUTION INTRAVENOUS AS NEEDED
Status: DISCONTINUED | OUTPATIENT
Start: 2021-01-01 | End: 2021-01-01 | Stop reason: HOSPADM

## 2021-01-01 RX ORDER — INSULIN LISPRO 100 [IU]/ML
INJECTION, SOLUTION INTRAVENOUS; SUBCUTANEOUS EVERY 6 HOURS
Status: DISCONTINUED | OUTPATIENT
Start: 2021-01-01 | End: 2021-01-01 | Stop reason: HOSPADM

## 2021-01-01 RX ORDER — EPINEPHRINE 0.1 MG/ML
INJECTION INTRACARDIAC; INTRAVENOUS
Status: COMPLETED | OUTPATIENT
Start: 2021-01-01 | End: 2021-01-01

## 2021-01-01 RX ORDER — HEPARIN SODIUM 1000 [USP'U]/ML
INJECTION, SOLUTION INTRAVENOUS; SUBCUTANEOUS AS NEEDED
Status: DISCONTINUED | OUTPATIENT
Start: 2021-01-01 | End: 2021-01-01 | Stop reason: HOSPADM

## 2021-01-01 RX ORDER — LIDOCAINE HCL/PF 100 MG/5ML
SYRINGE (ML) INTRAVENOUS
Status: COMPLETED | OUTPATIENT
Start: 2021-01-01 | End: 2021-01-01

## 2021-01-01 RX ORDER — SODIUM CHLORIDE 0.9 % (FLUSH) 0.9 %
5-40 SYRINGE (ML) INJECTION AS NEEDED
Status: DISCONTINUED | OUTPATIENT
Start: 2021-01-01 | End: 2021-01-01 | Stop reason: HOSPADM

## 2021-01-01 RX ORDER — NOREPINEPHRINE BITARTRATE/D5W 8 MG/250ML
0-100 PLASTIC BAG, INJECTION (ML) INTRAVENOUS
Status: DISCONTINUED | OUTPATIENT
Start: 2021-01-01 | End: 2021-01-01 | Stop reason: HOSPADM

## 2021-01-01 RX ORDER — DEXTROSE MONOHYDRATE 50 MG/ML
75 INJECTION, SOLUTION INTRAVENOUS CONTINUOUS
Status: DISCONTINUED | OUTPATIENT
Start: 2021-01-01 | End: 2021-01-01 | Stop reason: HOSPADM

## 2021-01-01 RX ORDER — SODIUM BICARBONATE IN D5W 150/1000ML
PLASTIC BAG, INJECTION (ML) INTRAVENOUS CONTINUOUS
Status: DISCONTINUED | OUTPATIENT
Start: 2021-01-01 | End: 2021-01-01

## 2021-01-01 RX ORDER — MECLIZINE HYDROCHLORIDE 25 MG/1
25 TABLET ORAL
Qty: 20 TAB | Refills: 0 | Status: SHIPPED | OUTPATIENT
Start: 2021-01-01

## 2021-01-01 RX ORDER — ACETAMINOPHEN 650 MG/1
650 SUPPOSITORY RECTAL
Status: DISCONTINUED | OUTPATIENT
Start: 2021-01-01 | End: 2021-01-01 | Stop reason: HOSPADM

## 2021-01-01 RX ORDER — BALSAM PERU/CASTOR OIL
OINTMENT (GRAM) TOPICAL 2 TIMES DAILY
Status: DISCONTINUED | OUTPATIENT
Start: 2021-01-01 | End: 2021-01-01 | Stop reason: HOSPADM

## 2021-01-01 RX ORDER — CHLORHEXIDINE GLUCONATE 0.12 MG/ML
15 RINSE ORAL EVERY 12 HOURS
Status: DISCONTINUED | OUTPATIENT
Start: 2021-01-01 | End: 2021-01-01 | Stop reason: HOSPADM

## 2021-01-01 RX ORDER — PROPOFOL 10 MG/ML
INJECTION, EMULSION INTRAVENOUS
Status: DISCONTINUED
Start: 2021-01-01 | End: 2021-01-01 | Stop reason: WASHOUT

## 2021-01-01 RX ORDER — CALCIUM CHLORIDE INJECTION 100 MG/ML
INJECTION, SOLUTION INTRAVENOUS
Status: COMPLETED | OUTPATIENT
Start: 2021-01-01 | End: 2021-01-01

## 2021-01-01 RX ORDER — HYDROCORTISONE SODIUM SUCCINATE 100 MG/2ML
50 INJECTION, POWDER, FOR SOLUTION INTRAMUSCULAR; INTRAVENOUS EVERY 6 HOURS
Status: DISCONTINUED | OUTPATIENT
Start: 2021-01-01 | End: 2021-01-01 | Stop reason: HOSPADM

## 2021-01-01 RX ORDER — SODIUM CHLORIDE 0.9 % (FLUSH) 0.9 %
5-40 SYRINGE (ML) INJECTION EVERY 8 HOURS
Status: DISCONTINUED | OUTPATIENT
Start: 2021-01-01 | End: 2021-01-01 | Stop reason: HOSPADM

## 2021-01-01 RX ORDER — HEPARIN SODIUM 5000 [USP'U]/ML
5000 INJECTION, SOLUTION INTRAVENOUS; SUBCUTANEOUS EVERY 8 HOURS
Status: DISCONTINUED | OUTPATIENT
Start: 2021-01-01 | End: 2021-01-01 | Stop reason: HOSPADM

## 2021-01-01 RX ORDER — POTASSIUM CHLORIDE 29.8 MG/ML
20 INJECTION INTRAVENOUS ONCE
Status: DISCONTINUED | OUTPATIENT
Start: 2021-01-01 | End: 2021-01-01 | Stop reason: HOSPADM

## 2021-01-01 RX ORDER — POTASSIUM CHLORIDE 7.45 MG/ML
10 INJECTION INTRAVENOUS
Status: COMPLETED | OUTPATIENT
Start: 2021-01-01 | End: 2021-01-01

## 2021-01-01 RX ORDER — MAGNESIUM SULFATE HEPTAHYDRATE 40 MG/ML
2 INJECTION, SOLUTION INTRAVENOUS ONCE
Status: COMPLETED | OUTPATIENT
Start: 2021-01-01 | End: 2021-01-01

## 2021-01-01 RX ORDER — POTASSIUM CHLORIDE 29.8 MG/ML
20 INJECTION INTRAVENOUS AS NEEDED
Status: DISCONTINUED | OUTPATIENT
Start: 2021-01-01 | End: 2021-01-01 | Stop reason: HOSPADM

## 2021-01-01 RX ADMIN — POTASSIUM CHLORIDE 20 MEQ: 400 INJECTION, SOLUTION INTRAVENOUS at 04:23

## 2021-01-01 RX ADMIN — POTASSIUM CHLORIDE 20 MEQ: 400 INJECTION, SOLUTION INTRAVENOUS at 23:06

## 2021-01-01 RX ADMIN — POTASSIUM CHLORIDE 20 MEQ: 400 INJECTION, SOLUTION INTRAVENOUS at 10:33

## 2021-01-01 RX ADMIN — FAMOTIDINE 20 MG: 10 INJECTION, SOLUTION INTRAVENOUS at 20:30

## 2021-01-01 RX ADMIN — POTASSIUM CHLORIDE 20 MEQ: 400 INJECTION, SOLUTION INTRAVENOUS at 00:24

## 2021-01-01 RX ADMIN — ACETAMINOPHEN 650 MG: 325 TABLET ORAL at 04:09

## 2021-01-01 RX ADMIN — POTASSIUM CHLORIDE 40 MEQ: 750 TABLET, FILM COATED, EXTENDED RELEASE ORAL at 13:30

## 2021-01-01 RX ADMIN — SODIUM BICARBONATE 100 MEQ: 84 INJECTION, SOLUTION INTRAVENOUS at 22:25

## 2021-01-01 RX ADMIN — VANCOMYCIN HYDROCHLORIDE 1000 MG: 1 INJECTION, POWDER, LYOPHILIZED, FOR SOLUTION INTRAVENOUS at 04:09

## 2021-01-01 RX ADMIN — POTASSIUM CHLORIDE 20 MEQ: 400 INJECTION, SOLUTION INTRAVENOUS at 08:00

## 2021-01-01 RX ADMIN — NOREPINEPHRINE BITARTRATE 100 MCG/MIN: 1 INJECTION, SOLUTION, CONCENTRATE INTRAVENOUS at 20:25

## 2021-01-01 RX ADMIN — Medication 10 ML: at 16:11

## 2021-01-01 RX ADMIN — PHENYLEPHRINE HYDROCHLORIDE 210 MCG/MIN: 10 INJECTION INTRAVENOUS at 23:23

## 2021-01-01 RX ADMIN — POTASSIUM CHLORIDE 20 MEQ: 400 INJECTION, SOLUTION INTRAVENOUS at 08:52

## 2021-01-01 RX ADMIN — PIPERACILLIN AND TAZOBACTAM 3.38 G: 3; .375 INJECTION, POWDER, LYOPHILIZED, FOR SOLUTION INTRAVENOUS at 10:34

## 2021-01-01 RX ADMIN — CHLORHEXIDINE GLUCONATE 15 ML: 0.12 RINSE ORAL at 10:39

## 2021-01-01 RX ADMIN — CALCIUM CHLORIDE 1 G: 100 INJECTION, SOLUTION INTRAVENOUS at 04:15

## 2021-01-01 RX ADMIN — MANNITOL 12.5 G: 12.5 INJECTION, SOLUTION INTRAVENOUS at 02:20

## 2021-01-01 RX ADMIN — EPINEPHRINE 0.5 MCG/MIN: 1 INJECTION INTRAMUSCULAR; INTRAVENOUS; SUBCUTANEOUS at 06:27

## 2021-01-01 RX ADMIN — Medication 1 AMPULE: at 08:53

## 2021-01-01 RX ADMIN — SODIUM CHLORIDE 100 MG/HR: 900 INJECTION INTRAVENOUS at 18:53

## 2021-01-01 RX ADMIN — PIPERACILLIN AND TAZOBACTAM 3.38 G: 3; .375 INJECTION, POWDER, LYOPHILIZED, FOR SOLUTION INTRAVENOUS at 06:17

## 2021-01-01 RX ADMIN — AMIODARONE HYDROCHLORIDE 0.5 MG/MIN: 50 INJECTION, SOLUTION INTRAVENOUS at 17:01

## 2021-01-01 RX ADMIN — Medication 1 AMPULE: at 10:38

## 2021-01-01 RX ADMIN — INSULIN LISPRO 4 UNITS: 100 INJECTION, SOLUTION INTRAVENOUS; SUBCUTANEOUS at 18:14

## 2021-01-01 RX ADMIN — POTASSIUM CHLORIDE 20 MEQ: 400 INJECTION, SOLUTION INTRAVENOUS at 07:01

## 2021-01-01 RX ADMIN — ROCURONIUM BROMIDE 50 MG: 10 INJECTION INTRAVENOUS at 01:21

## 2021-01-01 RX ADMIN — CALCIUM CHLORIDE 1 G: 100 INJECTION, SOLUTION INTRAVENOUS at 04:24

## 2021-01-01 RX ADMIN — AMIODARONE HYDROCHLORIDE 0.5 MG/MIN: 50 INJECTION, SOLUTION INTRAVENOUS at 18:57

## 2021-01-01 RX ADMIN — HYDROCORTISONE SODIUM SUCCINATE 50 MG: 100 INJECTION, POWDER, FOR SOLUTION INTRAMUSCULAR; INTRAVENOUS at 18:14

## 2021-01-01 RX ADMIN — POTASSIUM BICARBONATE 40 MEQ: 782 TABLET, EFFERVESCENT ORAL at 05:31

## 2021-01-01 RX ADMIN — FAMOTIDINE 20 MG: 10 INJECTION, SOLUTION INTRAVENOUS at 10:38

## 2021-01-01 RX ADMIN — POTASSIUM CHLORIDE 20 MEQ: 400 INJECTION, SOLUTION INTRAVENOUS at 12:05

## 2021-01-01 RX ADMIN — SODIUM CHLORIDE, POTASSIUM CHLORIDE, SODIUM LACTATE AND CALCIUM CHLORIDE 1000 ML: 600; 310; 30; 20 INJECTION, SOLUTION INTRAVENOUS at 05:00

## 2021-01-01 RX ADMIN — NOREPINEPHRINE BITARTRATE 4 MCG/MIN: 1 INJECTION, SOLUTION, CONCENTRATE INTRAVENOUS at 10:34

## 2021-01-01 RX ADMIN — HYDROCORTISONE SODIUM SUCCINATE 50 MG: 100 INJECTION, POWDER, FOR SOLUTION INTRAMUSCULAR; INTRAVENOUS at 05:31

## 2021-01-01 RX ADMIN — POTASSIUM BICARBONATE 40 MEQ: 782 TABLET, EFFERVESCENT ORAL at 04:09

## 2021-01-01 RX ADMIN — HEPARIN SODIUM 5000 UNITS: 5000 INJECTION INTRAVENOUS; SUBCUTANEOUS at 06:18

## 2021-01-01 RX ADMIN — POTASSIUM CHLORIDE 20 MEQ: 400 INJECTION, SOLUTION INTRAVENOUS at 14:14

## 2021-01-01 RX ADMIN — POTASSIUM CHLORIDE 10 MEQ: 10 INJECTION, SOLUTION INTRAVENOUS at 13:30

## 2021-01-01 RX ADMIN — HYDROCORTISONE SODIUM SUCCINATE 50 MG: 100 INJECTION, POWDER, FOR SOLUTION INTRAMUSCULAR; INTRAVENOUS at 11:19

## 2021-01-01 RX ADMIN — SODIUM CHLORIDE, POTASSIUM CHLORIDE, SODIUM LACTATE AND CALCIUM CHLORIDE 1000 ML: 600; 310; 30; 20 INJECTION, SOLUTION INTRAVENOUS at 15:15

## 2021-01-01 RX ADMIN — MAGNESIUM SULFATE HEPTAHYDRATE 2 G: 40 INJECTION, SOLUTION INTRAVENOUS at 10:33

## 2021-01-01 RX ADMIN — PIPERACILLIN AND TAZOBACTAM 3.38 G: 3; .375 INJECTION, POWDER, LYOPHILIZED, FOR SOLUTION INTRAVENOUS at 16:51

## 2021-01-01 RX ADMIN — INSULIN LISPRO 3 UNITS: 100 INJECTION, SOLUTION INTRAVENOUS; SUBCUTANEOUS at 00:24

## 2021-01-01 RX ADMIN — NOREPINEPHRINE BITARTRATE 55 MCG/MIN: 1 INJECTION, SOLUTION, CONCENTRATE INTRAVENOUS at 17:33

## 2021-01-01 RX ADMIN — VANCOMYCIN HYDROCHLORIDE 1250 MG: 10 INJECTION, POWDER, LYOPHILIZED, FOR SOLUTION INTRAVENOUS at 11:10

## 2021-01-01 RX ADMIN — DEXTROSE MONOHYDRATE 75 ML/HR: 50 INJECTION, SOLUTION INTRAVENOUS at 21:01

## 2021-01-01 RX ADMIN — POTASSIUM CHLORIDE 20 MEQ: 400 INJECTION, SOLUTION INTRAVENOUS at 22:04

## 2021-01-01 RX ADMIN — HEPARIN SODIUM 5000 UNITS: 5000 INJECTION INTRAVENOUS; SUBCUTANEOUS at 22:19

## 2021-01-01 RX ADMIN — HEPARIN SODIUM 5000 UNITS: 5000 INJECTION INTRAVENOUS; SUBCUTANEOUS at 14:43

## 2021-01-01 RX ADMIN — HYDROCORTISONE SODIUM SUCCINATE 50 MG: 100 INJECTION, POWDER, FOR SOLUTION INTRAMUSCULAR; INTRAVENOUS at 00:24

## 2021-01-01 RX ADMIN — Medication 10 ML: at 05:49

## 2021-01-01 RX ADMIN — EPINEPHRINE 1 MG: 0.1 INJECTION, SOLUTION ENDOTRACHEAL; INTRACARDIAC; INTRAVENOUS at 04:13

## 2021-01-01 RX ADMIN — SODIUM CHLORIDE 1000 MG: 900 INJECTION INTRAVENOUS at 21:04

## 2021-01-01 RX ADMIN — EPINEPHRINE 1 MG: 0.1 INJECTION, SOLUTION ENDOTRACHEAL; INTRACARDIAC; INTRAVENOUS at 04:10

## 2021-01-01 RX ADMIN — HEPARIN SODIUM 30000 UNITS: 1000 INJECTION, SOLUTION INTRAVENOUS; SUBCUTANEOUS at 02:20

## 2021-01-01 RX ADMIN — LIDOCAINE HYDROCHLORIDE 100 MG: 20 INJECTION, SOLUTION INTRAVENOUS at 04:19

## 2021-01-01 RX ADMIN — NOREPINEPHRINE BITARTRATE 100 MCG/MIN: 1 INJECTION, SOLUTION, CONCENTRATE INTRAVENOUS at 16:13

## 2021-01-01 RX ADMIN — SODIUM CHLORIDE, POTASSIUM CHLORIDE, SODIUM LACTATE AND CALCIUM CHLORIDE: 600; 310; 30; 20 INJECTION, SOLUTION INTRAVENOUS at 01:39

## 2021-01-01 RX ADMIN — CHLORHEXIDINE GLUCONATE 15 ML: 0.12 RINSE ORAL at 20:01

## 2021-01-01 RX ADMIN — EPINEPHRINE 15 MCG/MIN: 1 INJECTION INTRAMUSCULAR; INTRAVENOUS; SUBCUTANEOUS at 20:00

## 2021-01-01 RX ADMIN — FUROSEMIDE 100 MG: 10 INJECTION, SOLUTION INTRAMUSCULAR; INTRAVENOUS at 02:20

## 2021-01-01 RX ADMIN — AMIODARONE HYDROCHLORIDE 1 MG/MIN: 50 INJECTION, SOLUTION INTRAVENOUS at 03:18

## 2021-01-01 RX ADMIN — Medication: at 04:57

## 2021-01-01 RX ADMIN — AMIODARONE HYDROCHLORIDE 1 MG/MIN: 50 INJECTION, SOLUTION INTRAVENOUS at 10:34

## 2021-01-01 RX ADMIN — SODIUM CHLORIDE 100 MG/HR: 900 INJECTION INTRAVENOUS at 20:00

## 2021-01-01 RX ADMIN — FAMOTIDINE 20 MG: 10 INJECTION, SOLUTION INTRAVENOUS at 08:53

## 2021-01-01 RX ADMIN — PIPERACILLIN AND TAZOBACTAM 3.38 G: 3; .375 INJECTION, POWDER, LYOPHILIZED, FOR SOLUTION INTRAVENOUS at 22:18

## 2021-01-01 RX ADMIN — HUMAN INSULIN 10 UNITS: 100 INJECTION, SUSPENSION SUBCUTANEOUS at 14:06

## 2021-01-01 RX ADMIN — POTASSIUM CHLORIDE 20 MEQ: 400 INJECTION, SOLUTION INTRAVENOUS at 14:00

## 2021-01-01 RX ADMIN — POTASSIUM CHLORIDE 20 MEQ: 400 INJECTION, SOLUTION INTRAVENOUS at 05:31

## 2021-01-01 RX ADMIN — SODIUM BICARBONATE 100 MEQ: 84 INJECTION, SOLUTION INTRAVENOUS at 04:15

## 2021-01-01 RX ADMIN — INSULIN LISPRO 5 UNITS: 100 INJECTION, SOLUTION INTRAVENOUS; SUBCUTANEOUS at 14:07

## 2021-01-01 RX ADMIN — DIAZEPAM 2.5 MG: 5 INJECTION, SOLUTION INTRAMUSCULAR; INTRAVENOUS at 12:47

## 2021-01-01 RX ADMIN — EPINEPHRINE 1 MCG/MIN: 1 INJECTION INTRAMUSCULAR; INTRAVENOUS; SUBCUTANEOUS at 04:58

## 2021-01-01 RX ADMIN — PIPERACILLIN AND TAZOBACTAM 3.38 G: 3; .375 INJECTION, POWDER, LYOPHILIZED, FOR SOLUTION INTRAVENOUS at 16:11

## 2021-01-01 RX ADMIN — CHLORHEXIDINE GLUCONATE 15 ML: 0.12 RINSE ORAL at 08:47

## 2021-01-01 RX ADMIN — POTASSIUM CHLORIDE 20 MEQ: 400 INJECTION, SOLUTION INTRAVENOUS at 16:50

## 2021-01-01 RX ADMIN — POTASSIUM CHLORIDE 20 MEQ: 400 INJECTION, SOLUTION INTRAVENOUS at 20:01

## 2021-01-01 RX ADMIN — PHENYLEPHRINE HYDROCHLORIDE 200 MCG/MIN: 10 INJECTION INTRAVENOUS at 15:54

## 2021-01-01 RX ADMIN — NOREPINEPHRINE BITARTRATE 45 MCG/MIN: 1 INJECTION, SOLUTION, CONCENTRATE INTRAVENOUS at 14:33

## 2021-01-01 RX ADMIN — PIPERACILLIN AND TAZOBACTAM 3.38 G: 3; .375 INJECTION, POWDER, LYOPHILIZED, FOR SOLUTION INTRAVENOUS at 00:17

## 2021-01-01 RX ADMIN — CASTOR OIL AND BALSAM, PERU: 788; 87 OINTMENT TOPICAL at 08:47

## 2021-01-01 RX ADMIN — POTASSIUM CHLORIDE 20 MEQ: 400 INJECTION, SOLUTION INTRAVENOUS at 20:56

## 2021-01-01 RX ADMIN — NOREPINEPHRINE BITARTRATE 100 MCG/MIN: 1 INJECTION, SOLUTION, CONCENTRATE INTRAVENOUS at 11:01

## 2021-01-01 RX ADMIN — EPINEPHRINE 10 MCG/MIN: 1 INJECTION INTRAMUSCULAR; INTRAVENOUS; SUBCUTANEOUS at 17:43

## 2021-01-01 RX ADMIN — DEXTROSE MONOHYDRATE 75 ML/HR: 50 INJECTION, SOLUTION INTRAVENOUS at 18:57

## 2021-01-01 RX ADMIN — LEVOTHYROXINE SODIUM ANHYDROUS 20 MCG/HR: 100 INJECTION, POWDER, LYOPHILIZED, FOR SOLUTION INTRAVENOUS at 20:00

## 2021-01-01 RX ADMIN — HUMAN INSULIN 15 UNITS: 100 INJECTION, SUSPENSION SUBCUTANEOUS at 18:20

## 2021-01-01 RX ADMIN — SODIUM BICARBONATE 50 MEQ: 84 INJECTION, SOLUTION INTRAVENOUS at 04:20

## 2021-01-01 RX ADMIN — NOREPINEPHRINE BITARTRATE 100 MCG/MIN: 1 INJECTION, SOLUTION, CONCENTRATE INTRAVENOUS at 05:43

## 2021-01-01 RX ADMIN — Medication 1 AMPULE: at 20:01

## 2021-01-01 RX ADMIN — HYDROCORTISONE SODIUM SUCCINATE 50 MG: 100 INJECTION, POWDER, FOR SOLUTION INTRAMUSCULAR; INTRAVENOUS at 10:38

## 2021-01-01 RX ADMIN — PHENYLEPHRINE HYDROCHLORIDE 15 MCG/MIN: 10 INJECTION INTRAVENOUS at 02:10

## 2021-01-01 RX ADMIN — Medication 10 ML: at 22:05

## 2021-01-01 RX ADMIN — DEXTROSE MONOHYDRATE 25 G: 25 INJECTION, SOLUTION INTRAVENOUS at 16:09

## 2021-01-01 RX ADMIN — EPINEPHRINE 1 MG: 0.1 INJECTION, SOLUTION ENDOTRACHEAL; INTRACARDIAC; INTRAVENOUS at 04:16

## 2021-01-01 RX ADMIN — EPINEPHRINE 10 MCG/MIN: 1 INJECTION INTRAMUSCULAR; INTRAVENOUS; SUBCUTANEOUS at 08:45

## 2021-01-01 RX ADMIN — POTASSIUM CHLORIDE 20 MEQ: 400 INJECTION, SOLUTION INTRAVENOUS at 15:00

## 2021-01-01 RX ADMIN — SODIUM BICARBONATE 100 MEQ: 84 INJECTION, SOLUTION INTRAVENOUS at 05:03

## 2021-01-01 RX ADMIN — Medication 10 ML: at 14:43

## 2021-01-01 RX ADMIN — PHENYLEPHRINE HYDROCHLORIDE 200 MCG/MIN: 10 INJECTION INTRAVENOUS at 21:01

## 2021-02-10 NOTE — DISCHARGE INSTRUCTIONS
[facility] SYSTEMS Departments     For adult and child immunizations, family planning, TB screening, STD testing and women's health services. [facility]: [place] [phone]      [facility]  25   [address]   [address]   170 [facility]: Fredrick Spence [address] [facility] [address] [phone]      [address]          [address] [place] [address]     For primary care services, woman and child wellness, and some clinics providing specialty care. [facility] -- [address] [facility] [address] [facility] [phone]   411 [facility] CHILDRENS [facility] [address] [facility] [address] 3614 [facility] [phone]   [address] [facility] [address] [phone] [address]  [place] 1604 [facility] 5850 [facility]  [phone]   [address] [place] [address] [address] [place] [phone]   [facility] 81 [facility] [phone]   [facility] 1051 [facility] [phone]   [facility]: [facility] 700 elvin Delgado [address] [place] [address], [address] [phone]     [address] [phone]   [facility] Outreach [address] [facility]  [phone]   [facility]  [address], [address] ([email or web address]/[email or web address] asp) [phone]         Sexual Health/Woman Wellness Clinics    For STD/HIV testing and treatment, pregnancy testing and services, men's health, birth control services, LGBT services, and hepatitis/HPV vaccine services. [facility] for [place] [employer] [address]. [facility] [address] [facility] [address] [facility] 1579 [address] [phone]   [facility] [address], 5th floor [phone]   Pregnancy [address] for Women [facility] DARRYL Muniz [phone]         Specialty Service [address]    [phone]   [place]   [phone]   Women, Infant and Children's Services: [address] [phone]       [address] [facility]   [phone]   [address]   [facility]     [phone]   [address] Crisis   [address] [phone]       Local Primary Care Physicians  [facility] [phone]  MD Ashley Hammer MD Buzz Passy, MD [facility] [phone]  Aiden Wood, FNP  MD Kandi Carlin MD Armanda Dye, MD [address] [address] [phone]  MD Jose M Powers [place] [address] [facility] [phone]  MD Mounika Russell MD Gaspar Oven, MD Gregary Fus, MD   [facility] [phone]  MKVY EUJMRY VW, MD Lorean Charters, MD Pervis Cranker, NP [address] [phone]  MD Andree Whitmore MD Lysbeth Medley, MD Auburn Feller, MD Wyvonne Fender, MD Gerlean Peaches, MD Jenney Edison, MD   33 57 [facility]  Phan Brown MD [address] [phone]  MD Christiana Regan, NP  MD Carlotta Lora MD Jackquline Ley, MD Tova Irons, MD Georgia Kindle, MD   1149 [facility] [phone]  MD Omar Cavazos, P  Vanessa Stevenson, MD Katrin Oneil MD Janie Laster, MD Arvella Monk, MD [facility] [phone]  MD Adrianna Wilkins MD Adelene Scudder, MD Merrell Semen, MD Nida Bullocks, MD   [facility] [phone]  MD Vikas Duncan MD [place] [phone]  Rheba Postal, MD Searcy Meckel, MD Randolph Bullocks Justin Sal, [address] [facility] Physicians [phone]  Yun Mendoza, MD Eleuterio Andrade, MD Leatha Gomes, MD Karyn Armijo, MD Jane Evangelista, MD My Odonnell, KONG Cook MD [address]  66   [phone]  [facility], MD Bright Quintero MD   2102 [facility] [phone]  [facility], MD Malini Jha, FNP  Holley Flores, PA-C  [place] Flores, FNP  Teresa Gillette, PA-DANNA [facility], MD Beverley Fothergill, KONG Granda, DO Miscellaneous:  [place] [phone]

## 2021-02-10 NOTE — ED NOTES
Discharge instructions and Rx reviewed with and given to pt by ER RN. No obvious distress noted, pt called for ride home, pt ambulatory on own accord with steady gait to waiting room.

## 2021-02-12 NOTE — ED PROVIDER NOTES
EMERGENCY DEPARTMENT HISTORY AND PHYSICAL EXAM 
 
 
Date: 2/10/2021 Patient Name: Marcelle Ramírez History of Presenting Illness Chief Complaint Patient presents with  Dizziness  
  room spinning dizziness with nausea x 1 week. History Provided By: Patient HPI: Marcelle Ramírez, 39 y.o. female with a past medical history significant for Anxiety, depression, headaches, panic attacks, medical problems as stated below presents to the ED with cc of moderate severe vertigo over the last 1 week. Patient reports longstanding issue of vertigo but it has not been a problem for at least 1 to 2 years. She reports she has been under tremendous stress at home which she says often brings on the symptoms. This dizziness is worse with standing and with moving of the head to the right or left. She reports associated nausea without vomiting. She denies any headache, visual changes, ataxia, focal neurologic complaints, or any other associated symptoms. No other exacerbating ameliorating factors. There are no other complaints, changes, or physical findings at this time. PCP: None No current facility-administered medications on file prior to encounter. Current Outpatient Medications on File Prior to Encounter Medication Sig Dispense Refill  methocarbamol (ROBAXIN-750) 750 mg tablet Take 1 Tab by mouth four (4) times daily. Indications: muscle spasm 20 Tab 0  
 dextromethorphan-guaiFENesin (ROBITUSSIN COUGH-CHEST EUSEBIO DM) 5-100 mg/5 mL liqd Take 10 mL by mouth every six (6) hours. 118 mL 0  
 fluticasone propionate (FLONASE) 50 mcg/actuation nasal spray 2 Sprays by Both Nostrils route daily. 1 Bottle 0  
 ondansetron (ZOFRAN ODT) 4 mg disintegrating tablet Take 1 Tab by mouth every eight (8) hours as needed for Nausea. 10 Tab 0  
 lidocaine (URO-JET) 2 % jelp jelly Apply to affected area three times daily as needed.  6 mL 0  
  traMADol (ULTRAM) 50 mg tablet Take 1 Tab by mouth every six (6) hours as needed for Pain. Max Daily Amount: 200 mg. Indications: Pain 10 Tab 0 Past History Past Medical History: 
Past Medical History:  
Diagnosis Date  Anxiety  Depression  Gyn exam   
 Dr Ktety Rogers  Headache(784.0)  Panic attack  Postpartum hemorrhage 1/2010  Psychiatric disorder   
 depression  Sleep trouble  Tobacco use disorder Past Surgical History: 
Past Surgical History:  
Procedure Laterality Date [address] [facility]  2003, 1/2010  HX GYN    
 D+C x 2  
 HX HEENT    
 tonsillectomy  HX PACEMAKER  09/2013  HX TUBAL LIGATION  4-2010 Family History: 
Family History Problem Relation Age of Onset  Cancer Maternal Grandmother  Diabetes Maternal Grandmother  Heart Disease Maternal Grandmother  Hypertension Maternal Grandmother  Hypertension Mother Social History: 
Social History Tobacco Use  Smoking status: Current Every Day Smoker Packs/day: 0.50 Years: 10.00 Pack years: 5.00 Types: Cigarettes  Smokeless tobacco: Never Used Substance Use Topics  Alcohol use: No  
  Comment: patient states she quit  Drug use: Yes Types: Prescription Comment: last drug use 6 months ago Allergies: 
No Known Allergies Review of Systems Review of Systems Constitutional: Negative for chills, diaphoresis, fatigue and fever. HENT: Negative for ear pain and sore throat. Eyes: Negative for pain and redness. Respiratory: Negative for cough and shortness of breath. Cardiovascular: Negative for chest pain and leg swelling. Gastrointestinal: Positive for nausea and vomiting. Negative for abdominal pain and diarrhea. Endocrine: Negative for cold intolerance and heat intolerance. Genitourinary: Negative for flank pain and hematuria. Musculoskeletal: Negative for back pain and neck stiffness. Skin: Negative for rash and wound. Neurological: Positive for dizziness. Negative for syncope and headaches. All other systems reviewed and are negative. Physical Exam  
Physical Exam 
Vitals signs and nursing note reviewed. Constitutional:   
   General: She is not in acute distress. Appearance: She is well-developed. She is not ill-appearing. HENT:  
   Head: Normocephalic and atraumatic. Mouth/Throat:  
   Pharynx: No oropharyngeal exudate. Eyes:  
   General: No visual field deficit. Conjunctiva/sclera: Conjunctivae normal.  
   Pupils: Pupils are equal, round, and reactive to light. Neck: Musculoskeletal: Normal range of motion. Cardiovascular:  
   Rate and Rhythm: Normal rate and regular rhythm. Heart sounds: No murmur. Pulmonary:  
   Effort: Pulmonary effort is normal. No respiratory distress. Breath sounds: Normal breath sounds. No wheezing. Abdominal:  
   General: Bowel sounds are normal. There is no distension. Palpations: Abdomen is soft. Tenderness: There is no abdominal tenderness. Musculoskeletal: Normal range of motion. General: No deformity. Skin: 
   General: Skin is warm and dry. Findings: No rash. Neurological:  
   Mental Status: She is alert and oriented to person, place, and time. GCS: GCS eye subscore is 4. GCS verbal subscore is 5. GCS motor subscore is 6. Cranial Nerves: Cranial nerves are intact. No cranial nerve deficit, dysarthria or facial asymmetry. Sensory: No sensory deficit. Motor: No pronator drift. Coordination: Coordination normal.  
   Comments: Patient has normal finger-to-nose testing, negative test of skew, head impulse testing shows catch-up saccades to the right. She has no truncal ataxia. Psychiatric:     
   Behavior: Behavior normal.  
 
 
 
Diagnostic Study Results Labs - No results found for this or any previous visit (from the past 24 hour(s)). Radiologic Studies - No orders to display CT Results  (Last 48 hours) None CXR Results  (Last 48 hours) None Medical Decision Making I am the first provider for this patient. I reviewed the vital signs, available nursing notes, past medical history, past surgical history, family history and social history. Vital Signs-Reviewed the patient's vital signs. No data found. Vitals:  
 02/10/21 1231 02/10/21 1301 02/10/21 1401 02/10/21 1500 BP: 106/65 107/66 115/78 (!) 126/100 Pulse: 61 64 60 (!) 57 Resp: 15 22 18 20 Temp:    98.3 °F (36.8 °C) SpO2: 96%  97% Weight:      
Height:      
 
 
Records Reviewed: Nursing records and medical records reviewed MDM: 
Patient presents with isolated vertigo. Most likely peripheral vertigo rather than Central vertigo. DDx: BPPV, acute labyrinthitis, vestibular neuritis, Meniere's dx, otitis media, acoustic neuroma, medication toxicity (aminoglycosides, loop diuretics, aspirin). Unlikely central cause of vertigo such as posterior mass or stroke as there are no associated red flag symptoms including diplopia, dysmetria, dysarthria, ataxia, unilateral numbness or weakness. Provider Notes (Medical Decision Making):  
Patient is a 30-year-old female presenting with symptoms consistent with acute peripheral vertigo. No findings of central cause on exam.  Patient found to be profoundly hypokalemic and was given both IV and oral replacement therapy in the ER. She had no dysrhythmia on her cardiac monitoring. Her vertigo completely resolved with IV fluids and IV Valium. I will treat her symptomatically as an outpatient with potassium replacement and meclizine. ED Course:  
Initial assessment performed. The patients presenting problems have been discussed, and they are in agreement with the care plan formulated and outlined with them. I have encouraged them to ask questions as they arise throughout their visit. Medications Administered   
 diazePAM (VALIUM) injection 2.5 mg   
 Admin Date 
02/10/2021 Action Given Dose 2.5 mg Route IntraVENous Administered By 
Radha Gilliam RN  
  
  
 potassium chloride 10 mEq in 100 ml IVPB Admin Date 
02/10/2021 Action New Bag Dose 
10 mEq Rate 
100 mL/hr Route IntraVENous Administered By 
Radha Gillaim RN  
  
  
 potassium chloride SR (KLOR-CON 10) tablet 40 mEq Admin Date 
02/10/2021 Action Given Dose 40 mEq Route Oral Administered By 
Radha Gilliam RN Critical Care: 
None Disposition: 
1:37 PM 
Alisson Andrdae's  results have been reviewed with her. She has been counseled regarding her diagnosis. She verbally conveys understanding and agreement of the signs, symptoms, diagnosis, treatment and prognosis and additionally agrees to follow up as recommended with Dr. None in 24 - 48 hours. She also agrees with the care-plan and conveys that all of her questions have been answered. I have also put together some discharge instructions for her that include: 1) educational information regarding their diagnosis, 2) how to care for their diagnosis at home, as well a 3) list of reasons why they would want to return to the ED prior to their follow-up appointment, should their condition change. DISCHARGE PLAN: 
1. Discharge Medication List as of 2/10/2021  3:22 PM  
  
START taking these medications Details  
potassium chloride SR (KLOR-CON 10) 10 mEq tablet Take 2 Tabs by mouth daily. , Normal, Disp-30 Tab, R-0  
  
  
CONTINUE these medications which have CHANGED Details  
meclizine (ANTIVERT) 25 mg tablet Take 1 Tab by mouth three (3) times daily as needed for Dizziness., Normal, Disp-20 Tab, R-0  
  
  
CONTINUE these medications which have NOT CHANGED Details  
methocarbamol (ROBAXIN-750) 750 mg tablet Take 1 Tab by mouth four (4) times daily.  Indications: muscle spasm, Normal, Disp-20 Tab, R-0  
  
 dextromethorphan-guaiFENesin (ROBITUSSIN COUGH-CHEST EUSEBIO DM) 5-100 mg/5 mL liqd Take 10 mL by mouth every six (6) hours. , Normal, Disp-118 mL, R-0  
  
fluticasone propionate (FLONASE) 50 mcg/actuation nasal spray 2 Sprays by Both Nostrils route daily. , Normal, Disp-1 Bottle, R-0  
  
ondansetron (ZOFRAN ODT) 4 mg disintegrating tablet Take 1 Tab by mouth every eight (8) hours as needed for Nausea., Normal, Disp-10 Tab, R-0  
  
lidocaine (URO-JET) 2 % jelp jelly Apply to affected area three times daily as needed., Normal, Disp-6 mL, R-0  
  
traMADol (ULTRAM) 50 mg tablet Take 1 Tab by mouth every six (6) hours as needed for Pain. Max Daily Amount: 200 mg. Indications: Pain, Print, Disp-10 Tab, R-0  
  
  
 
2. Follow-up Information Follow up With Specialties Details Why Contact Info Primary Care Physician (please see list)  In 1 week For a follow-up evaluation. You will need to have your potassium levels re-checked. [facility] EMERGENCY DEPT Emergency Medicine In 3 days For a follow-up evaluation. [address] [place] [address] 
[phone] 3. Return to ED if worse Diagnosis Clinical Impression: 1. Hypokalemia 2. Peripheral vertigo, unspecified laterality Attestations: 
 
Sylvia Gaviria MD 
 
Please note that this dictation was completed with [employer], the computer voice recognition software. Quite often unanticipated grammatical, syntax, homophones, and other interpretive errors are inadvertently transcribed by the computer software. Please disregard these errors. Please excuse any errors that have escaped final proofreading. Thank you.

## 2021-06-23 PROBLEM — I46.9 CARDIAC ARREST (HCC): Status: ACTIVE | Noted: 2021-01-01

## 2021-06-23 NOTE — PROGRESS NOTES
Palliative Medicine  [place] [phone] (COPE)        The patient was admitted from home after significant other awoke to the patient being unresponsive, significant other performed CPR at home and called EMS. The SW met with patient's aunt, significant other, and best friend briefly at bedside to offer support along with Nancy Palliative Medicine NP. The patient's children are currently in waiting room, youngest age approximately 9-12. The patient's significant other is appropriately tearful at bedside, SW introduced role and will be available for emotional support. The patient is a DNR, prognosis is grim- Saundra NP and SW created rhythm strip memory bottles to be given to family, along with resources for [facility] [place] [facility] in the [facility] area- left at nursing station with bedside RN, family stepped away. The Palliative Medicine team will continue to follow for emotional and supportive needs.      Dana Butcher LCSW

## 2021-06-23 NOTE — ED NOTES
Patient is being transferred to [facility] 2 Critical Care 2, Room # 900 0136. Report given to [facility], RN on Ovidio Campo for routine progression of care. Report consisted of the following information SBAR, Kardex and ED Summary. Patient transferred to receiving unit by: Salazar (RN or tech name). Outstanding consults needed: No     Next labs due: No     The following personal items will be sent with the patient during transfer to the floor:     All valuables:    Cardiac monitoring ordered: Yes    The following CURRENT information was reported to the receiving RN:    Code status: Prior at time of transfer    Last set of vital signs:  Vital Signs  Level of Consciousness: (!) Unresponsive (3) (06/23/21 0442)  Temp: (!) 95.2 °F (35.1 °C) (06/23/21 0442)  Temp Source: Rectal (06/23/21 0442)  Pulse (Heart Rate): (!) 50 (06/23/21 0445)  Heart Rate Source: Monitor (06/23/21 0442)  Cardiac Rhythm: V Fib (06/23/21 0409)  Resp Rate: 19 (06/23/21 0445)  BP: (!) 86/43 (06/23/21 0445)  MAP (Monitor): (!) 56 (06/23/21 0445)  MAP (Calculated): (!) 57 (06/23/21 0445)         Oxygen Therapy  O2 Sat (%): 100 % (06/23/21 0445)  Pulse via Oximetry: 51 beats per minute (06/23/21 0445)  FIO2 (%): 100 % (06/23/21 0434)      Last pain assessment:         Wounds: No     Urinary catheter: aguila  Is there a aguila order: Yes    LDAs:       Peripheral IV 06/23/21 Left Antecubital (Active)   Site Assessment Clean, dry, & intact 06/23/21 0411   Phlebitis Assessment 0 06/23/21 0411   Infiltration Assessment 0 06/23/21 0411   Dressing Status Clean, dry, & intact 06/23/21 0411   Hub Color/Line Status Green 06/23/21 0411   Action Taken Blood drawn 06/23/21 0411       Peripheral IV 06/23/21 Right Antecubital (Active)     Nasogastric Tube 06/23/21 (Active)     Airway - Endotracheal Tube Oral (Active)   Insertion Depth (cm) 23 cm 06/23/21 0434   Line Monster Teeth 06/23/21 0434   Side Secured Device 06/23/21 0434   Site Assessment Intact 06/23/21 0436 Opportunity for questions and clarification was provided.     Isabela Urias

## 2021-06-23 NOTE — CONSULTS
Pt seen and examined. Full consult dictated. Prior pacer 2013. Apparently has not followed up     S/p out of hosp of hospital cardiac arrest with prolonged CPR    Initial K 2.3 and strips showed Vfib    Pacer shows some loss of capture- probably more due to severe cardiac injury rather than   Pacer malfxn. Prognosis very poor.      Will contact [employer] rep to check pacer

## 2021-06-23 NOTE — ED PROVIDER NOTES
EMERGENCY DEPARTMENT HISTORY AND PHYSICAL EXAM     ----------------------------------------------------------------------------  Please note that this dictation was completed with [employer], the computer voice recognition software. Quite often unanticipated grammatical, syntax, homophones, and other interpretive errors are inadvertently transcribed by the computer software. Please disregard these errors. Please excuse any errors that have escaped final proofreading  ----------------------------------------------------------------------------      Date: 6/23/2021  Patient Name: Ruiz Valdez    History of Presenting Illness     No chief complaint on file. History Provided By:  EMS, SO/family    HPI: Ruiz Valdez is a 39 y.o. female, with significant pmhx of pacemaker placement, anxiety, depression, peripheral vertigo, who presents via EMS to the ED with r/o unresponsiveness. Pt was reportedly last known well at 2130 last night. SO reported no changes to their regular routine, which includes smoking weed prior to going to bed. SO reports waking up around 0315 this morning and checked on the pt, noting she had bilateral hand contractures and was unresponsive. He notes trying to open her mouth with his fingers but had difficulty doing so due to her jaw \"being clamped shut. \" He notes calling 911 and initiating bystander CPR. EMS notes pt in vfib/vtach on their arrival and throughout transport. Pt received 6 defibrillations with 8 rounds of epi. Pt also received amio (300 and 150). Pupils were \"fixed and dilated\" and therefore, no narcan was provided. Pt arrives with active CPR ongoing and noted to have fine VFib on monitor during pulse checks. Pt's family relays that pt did not have any out of the ordinary complaints today, noting only that she was tired today because she worked earlier in the day.     Pt with pacemaker placed 7 years ago reportedly due to episodes of bradycardia.     PCP: None    No Known Allergies    Current Facility-Administered Medications   Medication Dose Route Frequency Provider Last Rate Last Admin    EPINEPHrine (ADRENALIN) 0.1 mg/mL 0.1 mg/mL syringe             EPINEPHrine (ADRENALIN) 5 mg in 0.9% sodium chloride 250 mL infusion  0-10 mcg/min IntraVENous TITRATE Bisi Soto MD 18 mL/hr at 06/23/21 0929 6 mcg/min at 06/23/21 0929    sodium chloride (NS) flush 5-40 mL  5-40 mL IntraVENous Q8H Roberto Gomez NP        sodium chloride (NS) flush 5-40 mL  5-40 mL IntraVENous PRN Roberto Gomez NP        acetaminophen (TYLENOL) tablet 650 mg  650 mg Oral Q6H PRN Roberto Gomez NP        Or   Dylan Nenoé acetaminophen (TYLENOL) suppository 650 mg  650 mg Rectal Q6H PRN Roberto Gomez NP        polyethylene glycol (MIRALAX) packet 17 g  17 g Oral DAILY PRN Roberto Gomez NP        ondansetron (ZOFRAN ODT) tablet 4 mg  4 mg Oral Q8H PRN Roberto Gomez NP        Or    ondansetron [facility]) injection 4 mg  4 mg IntraVENous Q6H PRN Roberto Gomez NP        heparin (porcine) injection 5,000 Units  5,000 Units SubCUTAneous Q8H Roberto Gomez NP        chlorhexidine (ORAL CARE KIT) 0.12 % mouthwash 15 mL  15 mL Oral Q12H Roberto Gomez NP   15 mL at 06/23/21 1039    insulin lispro (HUMALOG) injection   SubCUTAneous Q6H Roberto Gomez NP        glucose chewable tablet 16 g  4 Tablet Oral PRN Roberto Gomez NP        dextrose (D50W) injection syrg 12.5-25 g  12.5-25 g IntraVENous PRN Roberto Gomez NP        glucagon (GLUCAGEN) injection 1 mg  1 mg IntraMUSCular PRN Roberto Gomez NP        piperacillin-tazobactam (ZOSYN) 3.375 g in 0.9% sodium chloride (MBP/ADV) 100 mL MBP  3.375 g IntraVENous Glenny Tinsley  mL/hr at 06/23/21 1034 3.375 g at 06/23/21 1034    Followed by   Dylan Perez piperacillin-tazobactam (ZOSYN) 3.375 g in 0.9% sodium chloride (MBP/ADV) 100 mL MBP  3.375 g IntraVENous Q8H Susan Cummins MD        vancomycin [facility]) 1250 mg in  ml infusion  1,250 mg IntraVENous Davion Betancur MD        magnesium sulfate 2 g/50 ml IVPB (premix or compounded)  2 g IntraVENous Zahraa Matias MD 50 mL/hr at 06/23/21 1033 2 g at 06/23/21 1033    potassium chloride 20 mEq in 50 ml IVPB  20 mEq IntraVENous Q1H Moreno Chew MD 50 mL/hr at 06/23/21 1033 20 mEq at 06/23/21 1033    amiodarone (CORDARONE) 375 mg/250 mL D5W infusion  1 mg/min IntraVENous TITRATE Moreno Chew MD 40 mL/hr at 06/23/21 1034 1 mg/min at 06/23/21 1034    NOREPINephrine (LEVOPHED) 8 mg in 5% dextrose 250mL (32 mcg/mL) infusion  0.5-30 mcg/min IntraVENous TITRATE Moreno Chew MD 7.5 mL/hr at 06/23/21 1034 4 mcg/min at 06/23/21 1034    hydrocortisone Sod Succ (PF) (SOLU-CORTEF) injection 50 mg  50 mg IntraVENous Q6H Moreno Chew MD   50 mg at 06/23/21 1038    famotidine (PF) (PEPCID) 20 mg in 0.9% sodium chloride 10 mL injection  20 mg IntraVENous Q12H Moreno Chew MD   20 mg at 06/23/21 1038    alcohol 62% (NOZIN) nasal  1 Ampule  1 Ampule Topical Q12H Moreno Chew MD   1 Ampule at 06/23/21 1038       Past History     Past Medical History:  Past Medical History:   Diagnosis Date    Anxiety     Depression     Gyn exam     Dr Tobias Apo Headache(784.0)     Panic attack     Postpartum hemorrhage 1/2010    Psychiatric disorder     depression    Sleep trouble     Tobacco use disorder        Past Surgical History:  Past Surgical History:   Procedure Laterality Date    DILATION AND CURETTAGE  2003, 1/2010    HX GYN      D+C x 2    HX HEENT      tonsillectomy    HX PACEMAKER  09/2013    HX TUBAL LIGATION  4-2010       Family History:  Family History   Problem Relation Age of Onset    Cancer Maternal Grandmother     Diabetes Maternal Grandmother     Heart Disease Maternal Grandmother     Hypertension Maternal Grandmother     Hypertension Mother        Social History:  Social History     Tobacco Use    Smoking status: Current Every Day Smoker     Packs/day: 0.50     Years: 10.00     Pack years: 5.00     Types: Cigarettes    Smokeless tobacco: Never Used   Substance Use Topics    Alcohol use: No     Comment: patient states she quit    Drug use: Yes     Types: Prescription     Comment: last drug use 6 months ago       Allergies:  No Known Allergies      Review of Systems   Review of Systems   Unable to perform ROS: Acuity of condition         Physical Exam   Physical Exam  Vitals and nursing note reviewed. Constitutional:       General: She is in acute distress. Appearance: She is normal weight. She is ill-appearing and diaphoretic. Interventions: She is intubated. HENT:      Head: Normocephalic and atraumatic. Nose: Nose normal.   Eyes:      General: No scleral icterus. Conjunctiva/sclera: Conjunctivae normal.      Comments: Pupils fixed and 3mm bilaterally   Neck:      Trachea: No tracheal deviation. Cardiovascular:      Rate and Rhythm: Rhythm irregularly irregular. Pulses:           Carotid pulses are 0 on the right side and 0 on the left side. Femoral pulses are 0 on the right side and 0 on the left side. Pulmonary:      Effort: She is intubated. Comments: Equal, symmetric chest rise with bagging  Abdominal:      General: Bowel sounds are absent. There is no distension. Palpations: Abdomen is soft. Skin:     Coloration: Skin is pale. Findings: No rash. Neurological:      Mental Status: She is unresponsive. GCS: GCS eye subscore is 1. GCS verbal subscore is 1. GCS motor subscore is 1.            Diagnostic Study Results     Labs -     Recent Results (from the past 12 hour(s))   POC TROPONIN-I    Collection Time: 06/23/21  4:17 AM   Result Value Ref Range    Troponin-I (POC) 0.07 0.00 - 0.08 ng/mL   BLOOD GAS,CHEM8,LACTIC ACID POC    Collection Time: 06/23/21  4:18 AM   Result Value Ref Range    Calcium, ionized (POC) 1.07 (L) 1.12 - 1.32 mmol/L BICARBONATE 23 mmol/L    Base deficit (POC) 10.4 mmol/L    Sample source VENOUS BLOOD      CO2, POC 25 (H) 19 - 24 MMOL/L    Sodium,  136 - 145 MMOL/L    Potassium, POC 2.1 (LL) 3.5 - 5.5 MMOL/L    Chloride,  100 - 108 MMOL/L    Glucose,  (H) 74 - 106 MG/DL    Creatinine, POC 1.0 0.6 - 1.3 MG/DL    Lactic Acid (POC) 10.79 (HH) 0.40 - 2.00 mmol/L    Critical value read back MD     pH, venous (POC) 6.98 (LL) 7.32 - 7.42      pCO2, venous (POC) 96.6 (H) 41 - 51 MMHG    pO2, venous (POC) 53 (H) 25 - 40 mmHg   SAMPLES BEING HELD    Collection Time: 06/23/21  4:24 AM   Result Value Ref Range    SAMPLES BEING HELD  1 RD, 1 PST, 1 SST, 1 BLUE, 1 LAV     COMMENT        Add-on orders for these samples will be processed based on acceptable specimen integrity and analyte stability, which may vary by analyte.    DRUG SCREEN, URINE    Collection Time: 06/23/21  4:39 AM   Result Value Ref Range    AMPHETAMINES Negative NEG      BARBITURATES Negative NEG      BENZODIAZEPINES Negative NEG      COCAINE Negative NEG      METHADONE Negative NEG      OPIATES Negative NEG      PCP(PHENCYCLIDINE) Negative NEG      THC (TH-CANNABINOL) Positive (A) NEG      Drug screen comment (NOTE)    URINALYSIS W/ REFLEX CULTURE    Collection Time: 06/23/21  4:39 AM    Specimen: Urine   Result Value Ref Range    Color YELLOW/STRAW      Appearance TURBID (A) CLEAR      Specific gravity 1.006 1.003 - 1.030      pH (UA) 6.5 5.0 - 8.0      Protein Negative NEG mg/dL    Glucose Negative NEG mg/dL    Ketone Negative NEG mg/dL    Bilirubin Negative NEG      Blood Negative NEG      Urobilinogen 1.0 0.2 - 1.0 EU/dL    Nitrites Negative NEG      Leukocyte Esterase Negative NEG      WBC 0-4 0 - 4 /hpf    RBC 0-5 0 - 5 /hpf    Epithelial cells FEW FEW /lpf    Bacteria Negative NEG /hpf    UA:UC IF INDICATED CULTURE NOT INDICATED BY UA RESULT CNI      Hyaline cast 0-2 0 - 5 /lpf   BLOOD GAS, ARTERIAL    Collection Time: 06/23/21  5:54 AM   Result Value Ref Range    pH 7.26 (L) 7.35 - 7.45      PCO2 63 (H) 35 - 45 mmHg    PO2 348 (H) 80 - 100 mmHg    O2  (H) 92 - 97 %    BICARBONATE 28 (H) 22 - 26 mmol/L    BASE DEFICIT 0.9 mmol/L    O2 METHOD VENT      Sample source ARTERIAL      SITE LEFT RADIAL      DESTIN'S TEST YES     CBC WITH AUTOMATED DIFF    Collection Time: 06/23/21  7:17 AM   Result Value Ref Range    WBC 32.3 (H) 3.6 - 11.0 K/uL    RBC 4.35 3.80 - 5.20 M/uL    HGB 13.9 11.5 - 16.0 g/dL    HCT 42.2 35.0 - 47.0 %    MCV 97.0 80.0 - 99.0 FL    MCH 32.0 26.0 - 34.0 PG    MCHC 32.9 30.0 - 36.5 g/dL    RDW 13.4 11.5 - 14.5 %    PLATELET 465 029 - 537 K/uL    MPV 11.3 8.9 - 12.9 FL    NRBC 0.0 0  WBC    ABSOLUTE NRBC 0.00 0.00 - 0.01 K/uL    NEUTROPHILS 83 (H) 32 - 75 %    LYMPHOCYTES 13 12 - 49 %    MONOCYTES 2 (L) 5 - 13 %    EOSINOPHILS 1 0 - 7 %    BASOPHILS 0 0 - 1 %    IMMATURE GRANULOCYTES 1 (H) 0.0 - 0.5 %    ABS. NEUTROPHILS 26.9 (H) 1.8 - 8.0 K/UL    ABS. LYMPHOCYTES 4.2 (H) 0.8 - 3.5 K/UL    ABS. MONOCYTES 0.6 0.0 - 1.0 K/UL    ABS. EOSINOPHILS 0.3 0.0 - 0.4 K/UL    ABS. BASOPHILS 0.0 0.0 - 0.1 K/UL    ABS. IMM. GRANS. 0.3 (H) 0.00 - 0.04 K/UL    DF SMEAR SCANNED      RBC COMMENTS MACROCYTOSIS  1+        RBC COMMENTS JAYDEN CELLS  PRESENT       METABOLIC PANEL, COMPREHENSIVE    Collection Time: 06/23/21  7:17 AM   Result Value Ref Range    Sodium 145 136 - 145 mmol/L    Potassium 2.3 (LL) 3.5 - 5.1 mmol/L    Chloride 106 97 - 108 mmol/L    CO2 29 21 - 32 mmol/L    Anion gap 10 5 - 15 mmol/L    Glucose 383 (H) 65 - 100 mg/dL    BUN 6 6 - 20 MG/DL    Creatinine 1.09 (H) 0.55 - 1.02 MG/DL    BUN/Creatinine ratio 6 (L) 12 - 20      GFR est AA >60 >60 ml/min/1.73m2    GFR est non-AA 54 (L) >60 ml/min/1.73m2    Calcium 9.3 8.5 - 10.1 MG/DL    Bilirubin, total 0.6 0.2 - 1.0 MG/DL    ALT (SGPT) 95 (H) 12 - 78 U/L    AST (SGOT) 393 (H) 15 - 37 U/L    Alk.  phosphatase 165 (H) 45 - 117 U/L    Protein, total 5.2 (L) 6.4 - 8.2 g/dL    Albumin 2.5 (L) 3.5 - 5.0 g/dL    Globulin 2.7 2.0 - 4.0 g/dL    A-G Ratio 0.9 (L) 1.1 - 2.2     TROPONIN I    Collection Time: 06/23/21  7:17 AM   Result Value Ref Range    Troponin-I, Qt. 88.40 (H) <0.05 ng/mL   PROCALCITONIN    Collection Time: 06/23/21  7:17 AM   Result Value Ref Range    Procalcitonin 0.44 ng/mL   MAGNESIUM    Collection Time: 06/23/21  7:17 AM   Result Value Ref Range    Magnesium 1.8 1.6 - 2.4 mg/dL   LACTIC ACID    Collection Time: 06/23/21  9:08 AM   Result Value Ref Range    Lactic acid 4.8 (HH) 0.4 - 2.0 MMOL/L   BLOOD GAS, ARTERIAL    Collection Time: 06/23/21  9:58 AM   Result Value Ref Range    pH 7.41 7.35 - 7.45      PCO2 42 35 - 45 mmHg    PO2 86 80 - 100 mmHg    O2 SAT 97 92 - 97 %    BICARBONATE 26 22 - 26 mmol/L    BASE EXCESS 1.6 mmol/L    O2 METHOD VENT      FIO2 50 %    MODE ASSIST CONTROL      Tidal volume 400.0      SET RATE 28      PEEP/CPAP 5.0      Sample source ARTERIAL      SITE RIGHT RADIAL      DESTIN'S TEST YES         Radiologic Studies -     CT Results  (Last 48 hours)               06/23/21 0612  CT HEAD WO CONT Final result    Impression:  Diffuse loss of gray-white differentiation, compatible with diffuse cerebral   anoxic injury. Narrative:  EXAM: CT HEAD WO CONT       INDICATION: ams       COMPARISON: 2/24/2018. CONTRAST: None. TECHNIQUE: Unenhanced CT of the head was performed using 5 mm images. Brain and   bone windows were generated. Coronal and sagittal reformats. CT dose reduction   was achieved through use of a standardized protocol tailored for this   examination and automatic exposure control for dose modulation. FINDINGS:   There is new diffuse loss of gray-white differentiation throughout both cerebral   hemispheres and the cerebellum. The cortical sulci are effaced. There is no   hydrocephalus. There is no intracranial hemorrhage or extra-axial collection. The basilar cisterns are effaced.        The bone windows demonstrate no abnormalities. The visualized portions of the   paranasal sinuses and mastoid air cells are clear. CXR Results  (Last 48 hours)               06/23/21 1015  XR CHEST PORT Final result    Impression:  1. Right IJ catheter tip projects over the mid to lower SVC. No evidence of   complication such as pneumothorax. 2.  Unchanged hazy bilateral lung disease. Narrative:  EXAM:  XR CHEST PORT       INDICATION: post line placement       COMPARISON: Earlier the same day. TECHNIQUE: Single frontal view of the chest.       FINDINGS: Patient is mildly rotated. A right IJ catheter tip projects over the   mid to lower SVC. Endotracheal tube terminates 4.5 cm from the rita. Enteric   tube terminates in the gastric body. Esophageal temperature probe terminates in   the lower esophagus. Unchanged hazy bilateral lung disease. No pleural effusion   or pneumothorax visualized. Left chest dual-lead cardiac device without evidence   of lead discontinuity. Cardiomediastinal silhouette is not enlarged. 06/23/21 0525  XR CHEST PORT Final result    Impression:  Endotracheal tube in satisfactory position. Mild bilateral   interstitial and alveolar opacities may represent pulmonary edema and/or   pneumonia. Narrative:  INDICATION: Hypoxia       COMPARISON: 2/24/2020       FINDINGS: AP portable imaging of the chest performed at 5:06 AM demonstrates a   stable cardiomediastinal silhouette. Endotracheal tube terminates just low the   level of the clavicular heads. Left-sided pacemaker is unchanged in position. Mild bilateral interstitial and alveolar opacities are noted. There is no   pleural effusion or pneumothorax. . No significant osseous abnormalities are   seen. Medical Decision Making   I am the first provider for this patient.     I reviewed the vital signs, available nursing notes, past medical history, past surgical history, family history and social history. Vital Signs-Reviewed the patient's vital signs. Patient Vitals for the past 12 hrs:   Temp Pulse Resp BP SpO2   06/23/21 0830  (!) 56  113/60 98 %   06/23/21 0820  (!) 50  (!) 72/51 97 %   06/23/21 0818  (!) 54 28  98 %   06/23/21 0801  (!) 49   99 %   06/23/21 0800 (!) 95.2 °F (35.1 °C) (!) 49  (!) 77/51 99 %   06/23/21 0715  60  (!) 93/39 91 %   06/23/21 0445  (!) 50 19 (!) 86/43 100 %   06/23/21 0443  (!) 47 19 (!) 87/50 100 %   06/23/21 0442 (!) 95.2 °F (35.1 °C) 78 22     06/23/21 0440  (!) 48 11 (!) 83/43 100 %   06/23/21 0435  81 23 90/68 100 %   06/23/21 0434  86 24  100 %   06/23/21 0430  88 14 113/70 100 %   06/23/21 0428  81 15 109/72 100 %   06/23/21 0426  83 13 113/71    06/23/21 0426  70 13 128/80    06/23/21 0423  71 16  100 %   06/23/21 0412  (!) 0          Pulse Oximetry Analysis - 70% on100% Fio2 by IGel  Rate: 0 bpm  Rhythm: asystole      Provider Notes (Medical Decision Making):     DDX:  Cardiac arrest, respiratory arrest, malignant arrhtymia, ich, drug use, cva    Plan:  ACLS protocols, ekg, labs, ua, uds, head ct,cxr, kub    Impression:  Cardiac arrest, malignant arrhythmia, prolonged down time, anoxic brain injury, hypokalemia    ED Course:   Initial assessment performed. The patients presenting problems have been discussed, and they are in agreement with the care plan formulated and outlined with them. I have encouraged them to ask questions as they arise throughout their visit. I reviewed the nursing notes and and vital signs from today's visit, as well as the electronic medical record system for any past medical records that were available that may contribute to the patients current condition, including previous ED visits for isolated vertigo    Nursing notes will be reviewed as they become available in realtime while the pt has been in the ED. Kiko Szymanski MD    I personally reviewed/interpreted pt's imaging.   Agree with official read by radiology as noted above. Monse Sheets MD      Procedure Note - Intubation:   Performed by Monse Sheets MD .     Immediately prior to the procedure, the patient was reevaluated and found suitable for the planned procedure and any planned medications. Immediately prior to the procedure a time out was called to verify the correct patient, procedure, equipment, staff, and marking as appropriate. A number 7.5 cuffed   ETT was placed to 21 cm at the teeth. Placement was evaluated by noting bilateral, symmetric breath sounds, good end-tidal CO2 detector color change , no breath sounds over stomach and chest x-ray visualization. Attempts required: 1. Complications: none. .  The procedure was tolerated well. Procedure Note - CPR Efforts:   I supervised and directed all CPR efforts. Procedure Time: 30 minutes    Procedure Note - Limited Bedside Ultrasound- Cardiac  Performed by: Monse Sheets MD   Indication: cardiac arrest  Transthoracic bedside US was performed using the subxiphoid view. Interpretation:  Cardiac activity was not appreciated showing fibrillations activity. Pericardial fluid was not appreciated. The procedure took 1-15 minutes, and pt tolerated well. Monse Sheets MD      Procedure Note - Limited Bedside Ultrasound- Cardiac  Performed by: Monse Sheets MD   Indication: cardiac arrest  Transthoracic bedside US was performed using the subxiphoid view. Interpretation:  Cardiac activity was appreciated showing hyperdynamic activity. Pericardial fluid was not appreciated. The procedure took 1-15 minutes, and pt tolerated well. Monse Sheets MD      PROGRESS NOTE:    Pt with return of spontaneous circulation after several rounds of ACLS protocol medications and CPR. Bedside ultrasound reveals symmetric cardiac squeeze with palpable pulses. Patient noted to be on amiodarone drip at this time.   Blood pressure within normal range but will monitor closely and initiate epi drip if becomes hypotensive. Will consult with ICU for further discussion about whether patient is a cooling candidate with ROSC. Spoke with patient family members and significant other noting patient's critical status and making concerns for prolonged anoxia. Patient to go to CT to evaluate for potential intracranial bleed. Ascencion Grene MD      ICU CONSULT NOTE:   [id number]  Ascencion Green MD spoke with NP Lori Pena,   Specialty: [facility] NP due to cardiac arrest.  Discussed pt's HPI and available diagnostic results thus far. Consultant notes pt is not a cooling candidate due to prolonged down time  Ascencion Green MD      ADMISSION NOTE:  10:56 AM  Patient is being admitted to the ICU. The results of their tests and reasons for their admission have been discussed with available family. They convey agreement and understanding for the need to be admitted and for their admission diagnosis. Ascencion Green MD               Critical Care Time:       CRITICAL CARE NOTE  IMPENDING DETERIORATION -Airway, Respiratory, Cardiovascular, CNS, Metabolic, Renal and Hepatic  ASSOCIATED RISK FACTORS - Hypotension, Shock, Hypoxia, Dysrhythmia, Metabolic changes, Dehydration, Vascular Compromise and CNS Decompensation  MANAGEMENT- Bedside Assessment and Supervision of Care  INTERPRETATION -  Xrays, CT Scan, Blood Gases, ECG, Blood Pressure, Cardiac Output Measures  and Screening Ultrasound  INTERVENTIONS - hemodynamic mngmt, vent mngmt, defibrillation, gastric tube, vascular control, Neurologic interventions  and Metobolic interventions  CASE REVIEW - Medical Sub-Specialist, Nursing and Family  TREATMENT RESPONSE -Improved  PERFORMED BY - Self  NOTES   :  I have spent 125 minutes of critical care time involved in lab review, consultations with specialist, family decision- making, bedside attention and documentation excluding time spent on any separately billed procedures.  During this entire length of time I was immediately available to the patient . Vito Strickland MD        Diagnosis     Clinical Impression:   1. Cardiac arrest ([facility] [address]    2. Malignant ventricular arrhythmias    3. Hypokalemia  4. acidosis    PLAN:  1.  Admit to ICU

## 2021-06-23 NOTE — PROCEDURES
[facility]      Procedure Note - Central Venous Access:   Performed by Melony Duran MD    Obtained informed Consent. Immediately prior to the procedure, the patient was reevaluated and found suitable for the planned procedure and any planned medications. Immediately prior to the procedure a time out was called to verify the correct patient, procedure, equipment, staff, and marking as appropriate. The site was prepped with ChloraPrep. Using Seldinger technique a quad Lumen CVC was placed in the Right via direct cannulation with 2 number of attempts for Blood Drawing and IV Access. Ultrasound Guidance was utilized. There was good blood return. The following complications were encountered: None. A follow-up chest x-ray was ordered post procedure. The procedure was tolerated well.

## 2021-06-23 NOTE — PROGRESS NOTES
Patient arrived via stretcher accompanied by nurse, tech, and respiratory therapist. CHG bath given, placed on cardiac monitor. See Flowsheet for VS. Respiratory therapist reported setting to vent had been adjusted < 1-hour ago; Assist control Rate=28;OM=168; Peep=5%; Fio2= 50%; labs drawn per providers order; Patient noted Afib on monitor; Bedside SBAR Report given to OLIVIER Fountain RN No

## 2021-06-23 NOTE — H&P
History and Physical    Patient: Opal Perez MRN: [id number]  SSN: [id number]    YOB: 1975  Age: 39 y.o. Sex: female      Subjective:      Alisson Carter is a 39 y.o. female with a PMH of anxiety, depression, previous SI, tobacco use disorder, bradycardia s/p pacemaker placement in 2013 who presented to the ED following VF arrest on 6/23. She was last known well at 2130 on 6/22 when she went to sleep with her significant other. At [address] [place] [address], her significant other rolled over in bed and noticed she was contracted in her upper extremities and pulseless. He began CPR and called EMS. EMS arrived at 0326 and found her to be in VF. They began ACLS. She was in VF upon arrival to ED. Received appropriate ACLS, please see code documentation. ROSC was achieved at 0422 in ED. She was intubated. Estimated total down time - 67 minutes. She was on epinephrine at 1mcg/min to maintain MAP >65. UDS was positive for THC. She had previously endorsed smoking marijuana in previous ED visits. UDS otherwise negative. Lactic acid 10. Venous Ph 6.9. Venous PCO2 96. Additional labs pending at the time of this note. CBC/BMP ordered/pending.     Past Medical History:   Diagnosis Date    Anxiety     Depression     Gyn exam     Dr Tonia Freeman Headache(784.0)     Panic attack     Postpartum hemorrhage 1/2010    Psychiatric disorder     depression    Sleep trouble     Tobacco use disorder      Past Surgical History:   Procedure Laterality Date    DILATION AND CURETTAGE  2003, 1/2010    HX GYN      D+C x 2    HX HEENT      tonsillectomy    HX PACEMAKER  09/2013    HX TUBAL LIGATION  4-2010      Family History   Problem Relation Age of Onset    Cancer Maternal Grandmother     Diabetes Maternal Grandmother     Heart Disease Maternal Grandmother     Hypertension Maternal Grandmother     Hypertension Mother      Social History     Tobacco Use    Smoking status: Current Every Day Smoker Packs/day: 0.50     Years: 10.00     Pack years: 5.00     Types: Cigarettes    Smokeless tobacco: Never Used   Substance Use Topics    Alcohol use: No     Comment: patient states she quit      Prior to Admission medications    Medication Sig Start Date End Date Taking? Authorizing Provider   potassium chloride SR (KLOR-CON 10) 10 mEq tablet Take 2 Tabs by mouth daily. 2/10/21   Romayne Shawl, MD   meclizine (ANTIVERT) 25 mg tablet Take 1 Tab by mouth three (3) times daily as needed for Dizziness. 2/10/21   Romayne Shawl, MD   methocarbamol (ROBAXIN-750) 750 mg tablet Take 1 Tab by mouth four (4) times daily. Indications: muscle spasm 2/24/20   Mario Servin PA   dextromethorphan-guaiFENesin (ROBITUSSIN COUGH-CHEST EUSEBIO DM) 5-100 mg/5 mL liqd Take 10 mL by mouth every six (6) hours. 2/24/20   Samantha Servin PA   fluticasone propionate (FLONASE) 50 mcg/actuation nasal spray 2 Sprays by Both Nostrils route daily. 2/24/20   Samantha Servin PA   ondansetron (ZOFRAN ODT) 4 mg disintegrating tablet Take 1 Tab by mouth every eight (8) hours as needed for Nausea. 7/19/18   Gilberto Aranda MD   lidocaine (URO-JET) 2 % jelp jelly Apply to affected area three times daily as needed. 7/17/18   Mario Servin PA   traMADol (ULTRAM) 50 mg tablet Take 1 Tab by mouth every six (6) hours as needed for Pain. Max Daily Amount: 200 mg.  Indications: Pain 7/17/18   SILVESTRE Hampton        No Known Allergies    Review of Systems:  Unable to obtain due to AMS    Objective:     Vitals:    06/23/21 0440 06/23/21 0442 06/23/21 0443 06/23/21 0445   BP: (!) 83/43  (!) 87/50 (!) 86/43   Pulse: (!) 48 78 (!) 47 (!) 50   Resp: 11 22 19 19   Temp:  (!) 95.2 °F (35.1 °C)     SpO2: 100%  100% 100%   Weight:          Physical Exam:  GENERAL: Unresponsive, no response to pain or stimulation, comatose state, not on sedation  EYE: Equal and sluggishly reactive  THROAT & NECK:Supple, no JVD  LUNG: CTAB, diminished bases  HEART: Irregular, bradycardic rate and rhythm, intermittent non-capturing pacer spikes, 1+ pulses, no edema  ABDOMEN:Soft, nontender, nondistended  EXTREMITIES:Cool extremities, 1+ pulses, no edema  SKIN: No rash or lesion  NEUROLOGIC: Does not withdraw to pain, does not follow commands, no cough, no gag, no corneals  PSYCHIATRIC:no demonstration of pain or agitation    Assessment:     VF arrest s/p ROSC: Downtime 67 minutes. Not requiring sedation at this time. - Holding on further amio given bradycardia  - Cardiology consult as below  - Not a cooling candidate due to prolonged downtime    Shock c/b lactic acidosis: Occurring in the setting of cardiac arrest. Likely cardiogenic following cardiac arrest, but etiology currently unclear. - Titrate epineprhine for MAP goal >65  - Hold off on antibiotics given no identified source of infection    Mechanical ventilation: Requiring mechanical ventilation for airway protection in the setting of VF arrest s/p ROSC  - Continue mechanical ventilation    Bradycardia s/p pacemaker: Pacemaker placed in 2013 by Dr Martina Matos for 2nd degree AV block including 2:1 AV block with HR in 30's. Now c/f pacemaker failure, though patient exhibiting profound acidemia. - Correct acid-base abnormalities  - BMP pending; correct abnormalities  - Cardiology consult; pacer interrogation    Hyperglycemia: SSI    Type 2 NSTEMI: Likely due to demand in the setting of cardiac arrest    ACP: I met with the patient's family and reviewed the patient's case, plan of care, and discussed that her overall prognosis was likely poor given her downtime. She remains a full code. CRITICAL CARE CONSULTANT ATTESTATION  I had a face to face encounter with the patient, reviewed and interpreted patient data including clinical events, labs, images, vital signs, I/O's, and examined patient.   I have discussed the case and the plan and management of the patient's care with the consulting services, the bedside nurses and the respiratory therapist.     NOTE OF PERSONAL INVOLVEMENT IN CARE   This patient has a high probability of imminent, clinically significant deterioration, which requires the highest level of preparedness to intervene urgently. I participated in the decision-making and personally managed or directed the management of the following life and organ supporting interventions that required my frequent assessment to treat or prevent imminent deterioration. I personally spent 70 minutes of critical care time. This is time spent at this critically ill patient's bedside actively involved in patient care as well as the coordination of care and discussions with the patient's family. This does not include any procedural time which has been billed separately.     Nedra Giles NP  [facility] [place] [facility]  6/23/2021        Signed By: Nedra Giles NP     June 23, 2021

## 2021-06-23 NOTE — PROGRESS NOTES
19: 15-Bedside and Verbal shift change report given to KRAIG Ferrara (oncoming nurse) by Koki Mooney (offgoing nurse). Report included the following information SBAR, Kardex, Intake/Output, MAR, Recent Results, Med Rec Status, Cardiac Rhythm SR-ST@ times and Alarm Parameters . 19:45- Complex assessment completed, mouth and aguila care done. Pt is intubated, not sedated, but unresponsive to pain, no corneal, cough, or gag, Atrial Paced on tele, BP supported by pressors, 7.5 ETT [id number](50%), NG to right Nare to low intermittent suction & flexiseal w/loose,watery, foul smelling stool, aguila, PIV(x2), RIJ CVC(x4) w/ [email or web address], Levo@60mcgs, Epi@10mcgs, and potassium runs infusing per order. Naomi and aftab's mother @bedside. [facility] in POD, and have approached pt family. 20:30-Discontinued lactic acid, and will hold BMP order until potassium runs have been completed, per NP, Loulou Blanco. Attempted to phone daughter from nurse's station, went to voicemail. 21:00-Blood drawn for [facility] labs. 22:25-[facility] coordinators leaving for the evening, left paperwork in chart. 23:30-Called NP, Loulou Simeon to discuss another pressor need if, no parameter increase for Levo gtt. Also, aftab Soy @bedside, cannot get in contact w/ daughter Eloy Marrow, who is the primary decision maker for patient. Aftab states that decision was made for pt to donate organs, but family wants to wait for pt mother to arrive from out of town, before proceeding with descisions. Covid swab sent as requirement for pre-donor testing, will not put pt on precaution. 23:45-Spoke to NP, Loulou Ekron, another pressor added to [facility] if Levo is maxed and MAP not able to be maintained. Called [facility] coordinator Silviano Unger, to have her come to call hospital to speak to family about situation.  Aftab said that they talked to Reji @beginning of shift and Atif Walker was on the phone and okay'ed pt to become donor as long as pt mother saw pt first.   00:00-reassessed, no changes, mouth and aguila care done. [facility] coordinator present, David Cunha, talking to alexia further about the process and explaining the steps, clarifying that nothing can move forward without the daughter Albin Sommer and verbally consenting. 00:30-Mother of patient, Carolina Cevallos @bedside to see pt, will not spend the night. Urine output dropping off.   02:05-AM blood collect and sent to lab.  02:10-BP started to drop, Levo maxed and Osmar started to obtain readable BP.  02:40-Called lab,  says there are no tubes in the lab. There is also nothing in the system for this patient. 02:55-New labs drawn and sent. 03:30-Called, NP for critical potassium. Orders placed. 04:15-Reassessed, no changes, mouth and aguila care done. 06:00-Updated [facility] about VS, AM labs, and still unable to contact daughter  07:00-Bedside and Verbal shift change report given to [address] [place] [address] (oncoming nurse) by Paula Ling (offgoing nurse). Report included the following information SBAR, Kardex, Intake/Output, MAR, Recent Results, Med Rec Status and Cardiac Rhythm Atrial Paced.

## 2021-06-23 NOTE — PROGRESS NOTES
8650 Bedside shift report received from [facility]. Report included SBAR, Kardex, I/O, recent labs, patient events, and dual skin assessment. 0800 Shift assessment completed. Pt remains on vent 50%/5/28/400, sats >96%. Pt has no gag/cough, corneals, painful response to any stimuli and is showing decerbrate posturing. Pt is on amio @ 1mg, epi @1 mcg and receiving a bolus of LR, and bicarb @ 84 ml/hr. 36 Family is at bedside with Dr. Thea Perera. POC reviewed, questions and concerns answered. Patient made a DNR.     0930 R IJ Quad lumen placed by Dr. Thea Perera, time out performed prior to procedure. 1000 Pts blood pressure continues to trend down despite increase in epi drip. Order obtained for levo drip. Labs sent. 1015 IDR completed. Palliative care involved. [address] Regino Shone notified. Will continue to follow patient. Reassessment completed. Pt epi drip @ 10 mcg and levo @ 40 mcg with goal of keeping map>65. Family continues to be at bedside. Support provided. 1600 Reassessment completed. Order obtained to increase levo max to 100mcg due to patient requiring hiring pressor. [phone] Dr. Thea Perera talking to family about echo results. 1900 Bedside shift report given to [facility], RN. Report included SBAR, Kardex, I/O, recent labs, patient events, and dual skin assessment.

## 2021-06-23 NOTE — PROGRESS NOTES
Device: [employer]    Rep[employer]ung [employer]s [phone]    called for device check, per Dr Dore Fothergill

## 2021-06-23 NOTE — PROGRESS NOTES
Spiritual Care Assessment/Progress Note  [facility]      NAME: Africa Brown      MRN: [id number]  AGE: 39 y.o. SEX: female  Denominational Affiliation: Scientologist   Language: English     6/23/2021     Total Time (in minutes): 64     Spiritual Assessment begun in [facility] EMERGENCY DEPT through conversation with:         []Patient        [x] Family    [] Friend(s)        Reason for Consult: Crisis, Family care, Emergency Department visit     Spiritual beliefs: (Please include comment if needed)     [] Identifies with a johan tradition:         [] Supported by a johan community:            [] Claims no spiritual orientation:           [] Seeking spiritual identity:                [] Adheres to an individual form of spirituality:           [x] Not able to assess:                           Identified resources for coping:      [] Prayer                               [] Music                  [] Guided Imagery     [x] Family/friends                 [] Pet visits     [] Devotional reading                         [] Unknown     [] Other:                                               Interventions offered during this visit: (See comments for more details)          Family/Friend(s):  Affirmation of emotions/emotional suffering, Coping skills reviewed/reinforced, Catharsis/review of pertinent events in supportive environment, Initial Assessment     Plan of Care:     [] Support spiritual and/or cultural needs    [] Support AMD and/or advance care planning process      [] Support grieving process   [] Coordinate Rites and/or Rituals    [] Coordination with community clergy   [] No spiritual needs identified at this time   [] Detailed Plan of Care below (See Comments)  [] Make referral to Music Therapy  [] Make referral to Pet Therapy     [] Make referral to Addiction services  [] Make referral to [facility]  [] Make referral to Spiritual Care Partner  [] No future visits requested        [x] Follow up upon further referrals     Comments:      responded to ED  Sarbjit's spiritual care request for Ms. Laverne Ly and her family who had cardiac arrest. Pt's significant other Mr. Michele Goldman, who has been with the patient over 10 years and patient's oldest son Norah Kanner were in the conference room, SO's mother arrived.  provided non-anxious calm presence to them when they were in consult with Doctor. They discussed the next step in case the patient does not get better, clarified that the son is a decision-maker because Mr. Michele Goldman is not legally . Patient will be moved to [address] [place] [address],  will let them know and escort them to the pt's room. Ongoing support will be provided.  helped them see the patient in the ER room after her return from CT scan.      [id number] Claire Valentine, M.S., Th.M.  Spiritual Care Provider   Paging Service [phone])

## 2021-06-23 NOTE — CONSULTS
[facility]    Name:  Nora Cm  MR#:  [id number]  :  1975  ACCOUNT #:  [de-identified]  DATE OF SERVICE:  2021    REQUESTING PHYSICIAN:  Divya Velez NP    CHIEF COMPLAINT:  The patient is currently intubated and unresponsive. REASON FOR CONSULTATION:  Evaluate following cardiac arrest.    HISTORY OF PRESENT ILLNESS:  The patient is a 45-year-old female with a history of a prior permanent pacemaker placement in . This was done by Dr. Pino Bella for bradycardia and second-degree and 2:1 AV block. At that time, EF was 60%. There were no problems with pacemaker placement. The patient received a Biotronik device, which was placed subpectorally. The patient apparently has not followed up for pacemaker checks. She has no other previous cardiac issues. She was brought into the hospital early this morning after she suffered a cardiac arrest at home. According to records, she went to sleep around 11:30 last night. Her significant other noted she was pulseless at 3:15 in the morning. CPR was begun and the patient was found to be in V-fib on arrival of EMS. She had prolonged attempts of resuscitation, which was approximately 67 minutes. She was initially resuscitated in the ER. She did apparently smoke marijuana prior to going to sleep last night. No other documented history of substance abuse. Her troponin was 88 and she is now intubated and unresponsive on multiple pressors including IV epinephrine. EKG showed intermittent loss of capture of the pacemaker. PAST MEDICAL HISTORY:  As noted above, history of anxiety and depression. CURRENT MEDICATIONS:  IV epinephrine, IV amiodarone, Pepcid, Levophed, sodium bicarbonate, heparin subcutaneous 5000 units q.8 hours, magnesium sulfate, potassium chloride, IV Zosyn, IV vancomycin. SURGERIES:  Prior D and C, prior pacemaker placement, prior BTL, prior tonsillectomy.     SOCIAL HISTORY:  The patient does currently smoke. No documented history of alcohol abuse. FAMILY HISTORY:  Positive for hypertension in the patient's grandmother and mother. REVIEW OF SYSTEMS:  Not obtainable. PHYSICAL EXAMINATION:  GENERAL:  Reveals a middle-aged white female, unresponsive on a ventilator. VITAL SIGNS:  Blood pressure 113/60, heart rate 56, respirations 20-25, temperature 95.2. HEENT:  Pupils are pinpoint, nonreactive. There is an ET tube in the oropharynx. No obvious head trauma. NECK:  No masses or thyromegaly appreciated. There is a central line present. CHEST:  Coarse breath sounds bilaterally. SKIN:  Warm and dry. CARDIAC:  Tachycardic, 2/6 systolic murmur. No appreciable gallop. ABDOMEN:  Soft. No obvious masses. Bowel sounds hypoactive. EXTREMITIES:  Cool. No clubbing or cyanosis. No pedal edema. Distal pulses not well palpated. NEURO:  No obvious gross motor deficits. LABORATORY DATA:  BUN 18, creatinine 1.1. Troponin 88. Hemoglobin 13.9, white count 32.3. Potassium on admission 2.3. EKG, ventricular pacing with some pacemaker failure to capture. Chest x-ray, mild bilateral interstitial changes. IMPRESSION:  1. Status post out-of-hospital cardiac arrest with prolonged CPR. 2.  Severe hypokalemia noted on admission. 3.  Prior pacemaker placement in 2013. 4.  Probable severe anoxic encephalopathy. 5.  Markedly elevated troponin. 6.  Shock. RECOMMENDATIONS:  The patient's prognosis is obviously very poor as she has probably suffered severe anoxic injury. I think most likely the loss of capture noted on the EKGs is due to the underlying cardiac injury and is not a reflection of any pacemaker malfunction. We will go ahead and try to get the [employer] rep to come by and check the device. Otherwise, continue supportive therapy. An echocardiogram would be helpful to assess cardiac function. No other recommendations at this time.         [address] Demarco, MD [id number]  D:  06/23/2021 10:05  T:  06/23/2021 11:21  JOB #:  [id number]  CC:  MD Irineo Khan NP

## 2021-06-23 NOTE — PROGRESS NOTES
met pt's SO Mr. Mohan Lopez, and other family members as well. They all gathered in the room to share the moment with the pt and support each other. Father Coleman Gomez provided [employer] to the pt and family.  explored ay concern in his mind, Mr. Mohan Lopez said he is doing okay. Advised of  availability.     [id number] Weston Valentine., M.S., Th.M.  Spiritual Care Provider   Paging Service [phone])

## 2021-06-23 NOTE — CONSULTS
Palliative Medicine Consult  [place]air: [phone] ([phone])    Patient Name: Antione Dewey  YOB: 1975    Date of Initial Consult: 6/23/2021  Reason for Consult: Care Decisions  Requesting Provider: Qiana Santizo MD  Primary Care Physician: None     SUMMARY:   Antione Dewey is a 39 y.o. with a past history of anxiety, depression, panic attacks, and bradycardia s/p pacemaker placement in 2013, who was admitted on 6/23/2021 from home with a diagnosis of out of hospice cardiac arrest.  Estimated downtime of greater then 67min. Significant other shared that she went to bed at 2130, and at 0315 he found her contracted with no pulse. EMS arrived at 0326 and began ACLS. ROSC was achieved at 0422 in the ED. Not a candidate for cooling due to prolonged downtime. Current medical issues leading to Palliative Medicine involvement include: goals of care in the setting of critical illness following a prolonged downtime after an out of hospital arrest     PALLIATIVE DIAGNOSES:   1. Need for emotional support  2. DNR Discussion  3. Obtunded  4. S/p out of hospital cardiac arrest  5. Prolonged CPR  6. Probably severe anoxic injury     PLAN:   1. Prior to meeting patient I did an extensive review of the chart, and spoke with the attending  2. Ms César Andres is a DNR as of this morning  3. ICU team shared that her decision maker is her oldest child- who is 26 y/o, but that this child is including Mr Rocco Mcallister, her long time boyfriend, in on decisions  4. When I arrived at the room, patients boyfriend, aunt, and best friend were at bedside- kids were still at the hospital but in the waiting room  5. I introduced palliative medicine to the family, along with our  Donald Cazares  6. Mr Rocco Mcallister shared that the decision was made this morning for \"no heroic measures if her heart stops again\". He is clearly processing through the events of the last 18 hours and the suddenness of this event  7.  Offered emotional support, and gave him our card in the event he needs to talk more or the kids need some support  8. Asked our  to provide family with bereavement resources for the children  9. Initial consult note routed to primary continuity provider and/or primary health care team members  10. Communicated plan of care with: Palliative IDTAlexandra 192 Team     GOALS OF CARE / TREATMENT PREFERENCES:     GOALS OF CARE:  Patient/Health Care Proxy Stated Goals: Other (comment) (no escalation/heroics)    TREATMENT PREFERENCES:   Code Status: DNR    Advance Care Planning:  [x] [facility] Interdisciplinary Team has updated the ACP Navigator with Health Care Decision Maker and Patient Capacity      Primary Decision Maker: Bowen Higginbotham - Daughter    Primary Decision Maker: Adrianna Liu - Son    No flowsheet data found. Medical Interventions: Limited additional interventions     Other Instructions: Other:    As far as possible, the palliative care team has discussed with patient / health care proxy about goals of care / treatment preferences for patient.      HISTORY:     History obtained from: chart, family    CHIEF COMPLAINT: none- obtunded    HPI/SUBJECTIVE:    The patient is:   [] Verbal and participatory  [x] Non-participatory due to:     condition    Clinical Pain Assessment (nonverbal scale for severity on nonverbal patients):   Clinical Pain Assessment  Severity: 0          Duration: for how long has pt been experiencing pain (e.g., 2 days, 1 month, years)  Frequency: how often pain is an issue (e.g., several times per day, once every few days, constant)     FUNCTIONAL ASSESSMENT:     Palliative Performance Scale (PPS):  PPS: 10       PSYCHOSOCIAL/SPIRITUAL SCREENING:     Palliative IDT has assessed this patient for cultural preferences / practices and a referral made as appropriate to needs (Cultural Services, Patient Advocacy, Ethics, etc.)    Any spiritual / Anabaptist concerns:  [] Yes / [x] No    Caregiver Burnout:  [] Yes /  [x] No /  [] No Caregiver Present      Anticipatory grief assessment:   [x] Normal  / [] Maladaptive       ESAS Anxiety: Anxiety: 0    ESAS Depression: Depression: 0        REVIEW OF SYSTEMS:     Positive and pertinent negative findings in ROS are noted above in HPI. The following systems were [x] reviewed / [] unable to be reviewed as noted in HPI  Other findings are noted below. Systems: constitutional, ears/nose/mouth/throat, respiratory, gastrointestinal, genitourinary, musculoskeletal, integumentary, neurologic, psychiatric, endocrine. Positive findings noted below. Modified ESAS Completed by: provider   Fatigue: 10 Drowsiness: 0   Depression: 0 Pain: 0   Anxiety: 0 Nausea: 0     Dyspnea: 0           Stool Occurrence(s): 1        PHYSICAL EXAM:     From RN flowsheet:  Wt Readings from Last 3 Encounters:   06/23/21 123 lb 10.9 oz (56.1 kg)   02/10/21 103 lb 9.9 oz (47 kg)   02/24/20 105 lb 6.1 oz (47.8 kg)     Blood pressure 90/65, pulse (!) 55, temperature (!) 96 °F (35.6 °C), resp. rate 30, height 5' 5\" (1.651 m), weight 123 lb 10.9 oz (56.1 kg), SpO2 97 %.                          Last bowel movement, if known:     Constitutional: obtunded, not on sedation  Respiratory: on the vent     HISTORY:     Active Problems:    Cardiac arrest ([facility] (6/23/2021)      Need for emotional support ()      DNR (do not resuscitate) discussion ()      Obtunded ()      Anoxic brain injury ([facility] ()      Past Medical History:   Diagnosis Date    Anxiety     Depression     Gyn exam     Dr Holm Like Headache(784.0)     Panic attack     Postpartum hemorrhage 1/2010    Psychiatric disorder     depression    Sleep trouble     Tobacco use disorder       Past Surgical History:   Procedure Laterality Date    DILATION AND CURETTAGE  2003, 1/2010    HX GYN      D+C x 2    HX HEENT      tonsillectomy    HX PACEMAKER  09/2013    HX TUBAL LIGATION  4-2010      Family History   Problem Relation Age of Onset    Cancer Maternal Grandmother     Diabetes Maternal Grandmother     Heart Disease Maternal Grandmother     Hypertension Maternal Grandmother     Hypertension Mother       History reviewed, no pertinent family history.   Social History     Tobacco Use    Smoking status: Current Every Day Smoker     Packs/day: 0.50     Years: 10.00     Pack years: 5.00     Types: Cigarettes    Smokeless tobacco: Never Used   Substance Use Topics    Alcohol use: No     Comment: patient states she quit     No Known Allergies   Current Facility-Administered Medications   Medication Dose Route Frequency    EPINEPHrine (ADRENALIN) 0.1 mg/mL 0.1 mg/mL syringe        EPINEPHrine (ADRENALIN) 5 mg in 0.9% sodium chloride 250 mL infusion  0-10 mcg/min IntraVENous TITRATE    sodium chloride (NS) flush 5-40 mL  5-40 mL IntraVENous Q8H    sodium chloride (NS) flush 5-40 mL  5-40 mL IntraVENous PRN    acetaminophen (TYLENOL) tablet 650 mg  650 mg Oral Q6H PRN    Or    acetaminophen (TYLENOL) suppository 650 mg  650 mg Rectal Q6H PRN    polyethylene glycol (MIRALAX) packet 17 g  17 g Oral DAILY PRN    ondansetron (ZOFRAN ODT) tablet 4 mg  4 mg Oral Q8H PRN    Or    ondansetron (ZOFRAN) injection 4 mg  4 mg IntraVENous Q6H PRN    heparin (porcine) injection 5,000 Units  5,000 Units SubCUTAneous Q8H    chlorhexidine (ORAL CARE KIT) 0.12 % mouthwash 15 mL  15 mL Oral Q12H    insulin lispro (HUMALOG) injection   SubCUTAneous Q6H    glucose chewable tablet 16 g  4 Tablet Oral PRN    dextrose (D50W) injection syrg 12.5-25 g  12.5-25 g IntraVENous PRN    glucagon (GLUCAGEN) injection 1 mg  1 mg IntraMUSCular PRN    piperacillin-tazobactam (ZOSYN) 3.375 g in 0.9% sodium chloride (MBP/ADV) 100 mL MBP  3.375 g IntraVENous Q8H    vancomycin (VANCOCIN) 1250 mg in  ml infusion  1,250 mg IntraVENous ONCE    potassium chloride 20 mEq in 50 ml IVPB  20 mEq IntraVENous Q1H    amiodarone (CORDARONE) 375 mg/250 mL D5W infusion  1 mg/min IntraVENous TITRATE    NOREPINephrine (LEVOPHED) 8 mg in 5% dextrose 250mL (32 mcg/mL) infusion  0.5-50 mcg/min IntraVENous TITRATE    hydrocortisone Sod Succ (PF) (SOLU-CORTEF) injection 50 mg  50 mg IntraVENous Q6H    famotidine (PF) (PEPCID) 20 mg in 0.9% sodium chloride 10 mL injection  20 mg IntraVENous Q12H    alcohol 62% (NOZIN) nasal  1 Ampule  1 Ampule Topical Q12H    [START ON 6/24/2021] vancomycin (VANCOCIN) 1,000 mg in 0.9% sodium chloride 250 mL (VIAL-MATE)  1,000 mg IntraVENous Q18H          LAB AND IMAGING FINDINGS:     Lab Results   Component Value Date/Time    WBC 32.3 (H) 06/23/2021 07:17 AM    HGB 13.9 06/23/2021 07:17 AM    PLATELET 799 27/45/9417 07:17 AM     Lab Results   Component Value Date/Time    Sodium 143 06/23/2021 11:15 AM    Potassium 3.4 (L) 06/23/2021 11:15 AM    Chloride 106 06/23/2021 11:15 AM    CO2 33 (H) 06/23/2021 11:15 AM    BUN 12 06/23/2021 11:15 AM    Creatinine 1.16 (H) 06/23/2021 11:15 AM    Calcium 8.9 06/23/2021 11:15 AM    Magnesium 1.8 06/23/2021 07:17 AM    Phosphorus 1.4 (L) 06/23/2021 11:15 AM      Lab Results   Component Value Date/Time    Alk.  phosphatase 165 (H) 06/23/2021 07:17 AM    Protein, total 5.2 (L) 06/23/2021 07:17 AM    Albumin 2.1 (L) 06/23/2021 11:15 AM    Globulin 2.7 06/23/2021 07:17 AM     Lab Results   Component Value Date/Time    INR 1.7 (H) 06/23/2021 11:15 AM    Prothrombin time 17.2 (H) 06/23/2021 11:15 AM    aPTT 27.0 12/13/2010 10:13 PM      No results found for: IRON, FE, TIBC, IBCT, PSAT, FERR   Lab Results   Component Value Date/Time    pH 7.41 06/23/2021 09:58 AM    PCO2 42 06/23/2021 09:58 AM    PO2 86 06/23/2021 09:58 AM     No components found for: [place]   Lab Results   Component Value Date/Time    CK 43 05/12/2015 09:21 PM    CK - MB <0.5 (L) 05/12/2015 09:21 PM                Total time:   Counseling / coordination time, spent as noted above:   > 50% counseling / coordination?:     Prolonged service was provided for  []30 min   []75 min in face to face time in the presence of the patient, spent as noted above. Time Start:   Time End:   Note: this can only be billed with 53286 (initial) or 78737 (follow up). If multiple start / stop times, list each separately.

## 2021-06-23 NOTE — PROGRESS NOTES
Family meeting. Met with the patient's children including her 25year old daughter (primary decision maker), her two younger children and her long time partner, Mr. Mohan Lopez. Her oldest son, Fidelina Maloney, was not available. I updated them on her clinical status including the results of her head CT and vasopressor requirements. The family all agreed that she would never want to live on a ventilator or in a nursing home. She had recently taken care of a sick family member and had verbally expressed to her family what she would want done in a similar situation. We discussed her grave prognosis with her prolonged downtime. They all agreed that she should be DNR should her heart stop again. Will change code status.      Time spent with family on ACP 20 min    Alma Michel MD [address] [place] [phone]

## 2021-06-23 NOTE — PROGRESS NOTES
I prayed for Imelda Ospina and her family gathered, and celebrated with Imelda Ospina the [address].   Father Clary Marlow

## 2021-06-24 NOTE — PROGRESS NOTES
06/24/21 0615   ABCDEF Bundle   SBT Safety Screen Passed No   SBT Screen Reason for Failure Vasopressor use

## 2021-06-24 NOTE — PROGRESS NOTES
Critical Care Progress Note  Ugo Yee MD          Date of Service:  2021  NAME:  Mora Parrish  :  1975  MRN:  [id number]      Interval history / Subjective:      Patient remains intubated and unresponsive while being off sedation. No spontaneous resp effort. Hospital course:     Problem list:  Hospital Problems  Date Reviewed: 8/10/2013        Codes Class Noted POA    Cardiac arrest [facility]) ICD-10-CM: I46.9  ICD-9-CM: 427.5  2021 Unknown        Need for emotional support ICD-10-CM: R45.89  ICD-9-CM: 799.29  Unknown Unknown        DNR (do not resuscitate) discussion ICD-10-CM: Z71.89  ICD-9-CM: V65.49  Unknown Unknown        Obtunded ICD-10-CM: R40.1  ICD-9-CM: 780.09  Unknown Unknown        Anoxic brain injury ([facility] [address] [address] ICD-10-CM: G93.1  ICD-9-CM: 189. 1  Unknown Unknown              Assessment/Plan:     VF arrest s/p ROSC: Downtime 67 minutes. Not requiring sedation at this time. - Seen by cardiology.  - Not a cooling candidate due to prolonged downtime     Shock c/b lactic acidosis: Occurring in the setting of cardiac arrest. Likely cardiogenic following cardiac arrest, but etiology currently unclear. - Titrate epineprhine for MAP goal >65  - Hold off on antibiotics given no identified source of infection     Mechanical ventilation: Requiring mechanical ventilation for airway protection in the setting of VF arrest s/p ROSC  - Continue mechanical ventilation     Bradycardia s/p pacemaker: Pacemaker placed in  by Dr Yeimi Lynn for 2nd degree AV block including 2:1 AV block with HR in 30's. Now c/f pacemaker failure, though patient exhibiting profound acidemia. - Correct acid-base abnormalities     Hyperglycemia: SSI     Type 2 NSTEMI: Likely due to demand in the setting of cardiac arrest      Code status: DNR  DVT prophylaxis: [facility]    Family is willing to pursue comfort care and want organ donation.  Due to hemodynamic instability and metabolic issues including electrolyte abnormalities and acidosis, also she became hypoxic while I tried PSV to check for spontaneous respiration, would not be able to do apnea test. Will obtain cerebral perfusion scan for brain death declaration. 40 minutes of critical care time spent without overlap and excluding procedures. Review of Systems:   Review of systems not obtained due to patient factors.        Vital Signs:     Patient Vitals for the past 12 hrs:   Temp Pulse Resp BP SpO2   06/24/21 0915  (!) 108  103/73 94 %   06/24/21 0900  (!) 113  105/78 95 %   06/24/21 0845  (!) 111  106/77    06/24/21 0830  (!) 113  108/71    06/24/21 0823  (!) 111 28  95 %   06/24/21 0815 (!) 101 °F (38.3 °C) (!) 112 28 106/74 95 %   06/24/21 0800  (!) 106  98/65    06/24/21 0745  (!) 105  97/70 99 %   06/24/21 0730  (!) 111  98/69 (!) 89 %   06/24/21 0715  (!) 111  98/73 91 %   06/24/21 0700  (!) 112  104/77 95 %   06/24/21 0615  (!) 114  102/76 95 %   06/24/21 0545  (!) 111  101/72 97 %   06/24/21 0515  (!) 113  101/72 96 %   06/24/21 0500  (!) 115  107/73 98 %   06/24/21 0445  (!) 116  (!) 105/58 95 %   06/24/21 0430  (!) 113  105/73 95 %   06/24/21 0415 (!) 100.9 °F (38.3 °C) (!) 114 28 103/69 90 %   06/24/21 0400  (!) 118  104/71 96 %   06/24/21 0350  (!) 118 28  97 %   06/24/21 0345  (!) 118  106/76 98 %   06/24/21 0330  (!) 115  106/74 98 %   06/24/21 0315  (!) 115  108/79 97 %   06/24/21 0300  (!) 115  105/76 95 %   06/24/21 0245  (!) 119  108/77 99 %   06/24/21 0236  (!) 116  111/80 99 %   06/24/21 0230  (!) 116      06/24/21 0215  (!) 117      06/24/21 0209  (!) 114  (!) 105/47    06/24/21 0202  (!) 116      06/24/21 0200  (!) 111   100 %   06/24/21 0158  (!) 117      06/24/21 0115  (!) 113  (!) 85/68    06/24/21 0105  (!) 117  (!) 89/43    06/24/21 0045  (!) 116  103/61 91 %        Intake/Output Summary (Last 24 hours) at 6/24/2021 1233  Last data filed at 6/24/2021 0800  Gross per 24 hour   Intake 3921.58 ml   Output 1875 ml   Net 2046.58 ml        Physical Examination:    Physical Exam  Constitutional:       Comments: Intubated, unresponsive. HENT:      Head: Normocephalic and atraumatic. Mouth/Throat:      Mouth: Mucous membranes are moist.   Eyes:      Conjunctiva/sclera: Conjunctivae normal.      Comments: Dilated and fixed dante pupils   Cardiovascular:      Rate and Rhythm: Normal rate and regular rhythm. Pulmonary:      Breath sounds: Normal breath sounds. Comments: On mechanical ventilation  Abdominal:      General: Abdomen is flat. There is no distension. Palpations: Abdomen is soft. Musculoskeletal:         General: No swelling. Cervical back: Neck supple. Skin:     General: Skin is warm. Neurological:      Comments: Absent brain stem reflexes, no spontaneous respiratory effort.          Labs:     Recent Labs     06/24/21  0255 06/23/21  0717   WBC 31.0* 32.3*   HGB 14.5 13.9   HCT 44.0 42.2    224         Medications:     Current Facility-Administered Medications   Medication Dose Route Frequency    balsam peru-castor oiL (VENELEX) ointment   Topical BID    [START ON 6/25/2021] vancomycin (VANCOCIN) 750 mg in 0.9% sodium chloride 250 mL (VIAL-MATE)  750 mg IntraVENous Q24H    [START ON 6/25/2021] famotidine (PF) (PEPCID) 20 mg in 0.9% sodium chloride 10 mL injection  20 mg IntraVENous Q24H    technetium TC-99M pentetic acid (DRAXIMAGE DTPA) injection 38-35 millicurie  68-86 millicurie IntraVENous RAD ONCE    sodium chloride (NS) flush 5-40 mL  5-40 mL IntraVENous Q8H    sodium chloride (NS) flush 5-40 mL  5-40 mL IntraVENous PRN    acetaminophen (TYLENOL) tablet 650 mg  650 mg Oral Q6H PRN    Or    acetaminophen (TYLENOL) suppository 650 mg  650 mg Rectal Q6H PRN    polyethylene glycol (MIRALAX) packet 17 g  17 g Oral DAILY PRN    ondansetron (ZOFRAN ODT) tablet 4 mg  4 mg Oral Q8H PRN    Or    ondansetron [facility]) injection 4 mg  4 mg IntraVENous Q6H PRN    heparin (porcine) injection 5,000 Units  5,000 Units SubCUTAneous Q8H    chlorhexidine (ORAL CARE KIT) 0.12 % mouthwash 15 mL  15 mL Oral Q12H    insulin lispro (HUMALOG) injection   SubCUTAneous Q6H    glucose chewable tablet 16 g  4 Tablet Oral PRN    dextrose (D50W) injection syrg 12.5-25 g  12.5-25 g IntraVENous PRN    glucagon (GLUCAGEN) injection 1 mg  1 mg IntraMUSCular PRN    piperacillin-tazobactam (ZOSYN) 3.375 g in 0.9% sodium chloride (MBP/ADV) 100 mL MBP  3.375 g IntraVENous Q8H    amiodarone (CORDARONE) 375 mg/250 mL D5W infusion  1 mg/min IntraVENous TITRATE    hydrocortisone Sod Succ (PF) (SOLU-CORTEF) injection 50 mg  50 mg IntraVENous Q6H    alcohol 62% (NOZIN) nasal  1 Ampule  1 Ampule Topical Q12H    dextrose (D50W) injection syrg 12.5-25 g  12.5-25 g IntraVENous PRN    NOREPINephrine (LEVOPHED) 32 mg in 5% dextrose 250 mL infusion  0.5-100 mcg/min IntraVENous TITRATE    EPINEPHrine (ADRENALIN) 10 mg in 0.9% sodium chloride 250 mL infusion  0-10 mcg/min IntraVENous TITRATE    PHENYLephrine (LYNNETTE-SYNEPHRINE) 30 mg in 0.9% sodium chloride 250 mL infusion   mcg/min IntraVENous TITRATE     ______________________________________________________________________  EXPECTED LENGTH OF STAY: 4d 2h  ACTUAL LENGTH OF STAY:          1                 Ishan Tan MD   Pulmonary and critical care.

## 2021-06-24 NOTE — DISCHARGE SUMMARY
Death Discharge Summary            Patient ID:  Heriberto Jeffery  39 y.o.  1975  [id number]    Admit Date: 6/23/2021    Attending Physician:  Rachel Wong MD    Chief Complaint:  No chief complaint on file. Problem List:    Patient Active Problem List    Diagnosis Date Noted    Cardiac arrest ([facility] 06/23/2021    Need for emotional support     DNR (do not resuscitate) discussion     Obtunded     Anoxic brain injury ([facility]     Near syncope 08/08/2013    Bradycardia 08/08/2013    Unintentional weight loss 08/08/2013    Malnutrition ([facility] [address] 08/08/2013    Panic attack 09/15/2011    Insomnia 11/09/2010    Anxiety     Depression     Tobacco use disorder        Death Diagnosis:    Ventricular fibrillation leading to cardiac arrest and anoxic brain injury. Hospital Course:    Heriberto Jeffery is a 39 y.o. female with a PMH of anxiety, depression, previous SI, tobacco use disorder, bradycardia s/p pacemaker placement in 2013. She was brought to ER following V fib cardiac arrest on 6/23. She was last known well at 2130 on 6/22 when she went to sleep with her significant other. At [address] [place] [address], her significant other rolled over in bed and noticed she was contracted in her upper extremities and pulseless. He began CPR and called EMS. EMS arrived at 0326 and found her to be in VF. They began ACLS. She was in VF upon arrival to ED. Received appropriate ACLS. ROSC was achieved at 0422 in ED. She was intubated. Estimated total down time - 67 minutes. She was kept off sedation on mechanical ventilation and she had absent brain stem reflexes. Family decided to pursue comfort care but wanted organ donation. Cerebral perfusion scan was done which is indicative of brain death Patient is declared brain dead at 3:16pm after I reviewed the report of cerebral perfusion scan. Condition at discharge: Declared brain dead.       Santino Parker MD  [address]

## 2021-06-24 NOTE — PROGRESS NOTES
responded to the brain death notification of Ms. Boris Ramírez in [phone]. Pt's daughter Destiny Calle alone was in the room, grieving the loss.  provided grief support to her and created safe space to vent out her grief and sadness.  shard about the night's scene how it was traumatic when patient was admitted, how the family grieved and supported together, and affirmed Elena's mature attitude and acceptance despite its gravity. She also mentioned that patient is a pre-registered organ donor,  affirmed her and their decision giving lives to many people. Destiny Calle is going to be 22 yo soon, other family member wet home for shower. Pt's other children who are 12 and 16, they are not going to be here because it is too hard for them per Destiny Calle. Destiny Calle also mentioned that the last rite from Father Krys King was awesome. After the visit,  met [employer] representatives Mariajose Maurice and Jonelle Le, was told that the procurement surgery is planned 1 am tonight.  will deliver this info to on-call  so he can be ready to provide support when family needs emotional support. Advised of  availability.             [id number] [facility] Valentine., M.S., Th.M.  Spiritual Care Provider   Paging Service [phone]

## 2021-06-24 NOTE — PROGRESS NOTES
Cardiology Progress Note      6/24/2021 8:42 AM    Admit Date: 6/23/2021          Subjective: Pacer check disd show pacer near end of life though she is not pacer dep. Visit Vitals  /74   Pulse (!) 111   Temp (!) 101 °F (38.3 °C)   Resp 28   Ht 5' 5\" (1.651 m)   Wt 56.1 kg (123 lb 10.9 oz)   SpO2 95%   BMI 20.58 kg/m²     06/22 1901 - 06/24 0700  In: 7201.3 [I.V.:7201.3]  Out: 1860 [Urine:860; Drains:1000]        Objective:      Physical Exam:  VS as above  Chest coarse BS bilat  Card RRR no chenge  Neuro- posturing, unresponsive     Data Review:   Labs:      BUN 22  Creat 1.8  Hgb 14.5    Telemetry: SR       Assessment:     1. Status post out-of-hospital cardiac arrest with prolonged CPR. 2.  Severe hypokalemia noted on admission. 3.  Prior pacemaker placement in 2013. Pacer near ERO  4.  severe anoxic encephalopathy  5. Markedly elevated troponin. 6.  Shock. Plan:  Neuro fxn being evaluated. Likely will w/d of support.  Cont supportive rx

## 2021-06-24 NOTE — PROGRESS NOTES
Pharmacy Automatic Renal Dosing Protocol - Antimicrobials    Indication for Antimicrobials: sepsis unknown etiology     Current Regimen of Each Antimicrobial:   Piperacillin/tazobactam 3.375g IV q8h, start ; day 2  Vancomycin - pharmacy to dose; started ; day 2    Previous Antimicrobial Therapy:   N/a    Goal Level: VANCOMYCIN TROUGH GOAL RANGE  Vancomycin Trough: 15 - 20 mcg/mL  (AUC: 400 - 600 mg/hr/Liter/day)     Date Dose & Interval Measured (mcg/mL) Extrapolated (mcg/mL)                       Significant Cultures:    blood cx NGTD, pending   resp viral panel neg     Paralysis, amputations, malnutrition: none noted    Labs:  Recent Labs     21  0255 21  1553 21  1115 21  0717 21  0717   CREA 1.79* 1.38* 1.16*   < > 1.09*   BUN 22* 14 12   < > 6   WBC 31.0*  --   --   --  32.3*    < > = values in this interval not displayed. Temp (24hrs), Av.7 °F (37.1 °C), Min:96 °F (35.6 °C), Max:101 °F (38.3 °C)    Creatinine Clearance (mL/min) or Dialysis: 35.7 mL/min (IBW)    Impression/Plan:   Scr rising  Change vancomycin to 750 mg q24h  Anticipated   Continue current dose of zosyn; appropriate for indication/renal function    Antimicrobial stop date pending     Pharmacy will follow daily and adjust medications as appropriate for renal function and/or serum levels.     Thank you,  MARNIE Alegria

## 2021-06-24 NOTE — PROGRESS NOTES
Palliative Medicine Consult  [place]: [phone] ([phone])    Patient Name: Angelia Chavez  YOB: 1975    Date of Initial Consult: 6/23/2021  Reason for Consult: Care Decisions  Requesting Provider: Jax Vega MD  Primary Care Physician: None     SUMMARY:   Angelia Chavez is a 39 y.o. with a past history of anxiety, depression, panic attacks, and bradycardia s/p pacemaker placement in 2013, who was admitted on 6/23/2021 from home with a diagnosis of out of hospice cardiac arrest.  Estimated downtime of greater then 67min. Significant other shared that she went to bed at 2130, and at 0315 he found her contracted with no pulse. EMS arrived at 0326 and began ACLS. ROSC was achieved at 0422 in the ED. Not a candidate for cooling due to prolonged downtime. Current medical issues leading to Palliative Medicine involvement include: goals of care in the setting of critical illness following a prolonged downtime after an out of hospital arrest     PALLIATIVE DIAGNOSES:   1. Need for emotional support  2. DNR Discussion  3. Obtunded  4. S/p out of hospital cardiac arrest  5. Prolonged CPR  6. Probably severe anoxic injury     PLAN:   1. Met with patients daughter Olga Devlin  2. Walked her through my experience with attending organ donation   3. Provided her with resources for bereavement support that she and her family can utilize (including resources for kids)  4. She is handling all of this very well, she is a strong girl. She shared that her older brother Debby Cleveland, and her grandmother (jean mom) are deferring to her, as they don't feel that they can make decisions in this situation. She is feeling the weight of the responsibility, but she seems to have a lot of support. 5. She is trying to also support her younger sister- she is 15 and afraid to be away from Olga Devlin.   Her younger brother who is 12 is doing better, but Olga Claus feels that he will start to show emotions after her mom passes  6. Left her my card to call if she has more questions or needs support  7. Initial consult note routed to primary continuity provider and/or primary health care team members  8. Communicated plan of care with: Palliative IDTAlexandra Team     GOALS OF CARE / TREATMENT PREFERENCES:     GOALS OF CARE:  Patient/Health Care Proxy Stated Goals: Other (comment) (no escalation/heroics)    TREATMENT PREFERENCES:   Code Status: DNR    Advance Care Planning:  [x] The [facility] Interdisciplinary Team has updated the ACP Navigator with [address] and Patient Capacity      Primary Decision Maker (Active): Flavio Moscoso - [phone]    No flowsheet data found. Medical Interventions: Limited additional interventions     Other Instructions: Other:    As far as possible, the palliative care team has discussed with patient / health care proxy about goals of care / treatment preferences for patient.      HISTORY:     History obtained from: chart, family    CHIEF COMPLAINT: none- obtunded    HPI/SUBJECTIVE:    The patient is:   [] Verbal and participatory  [x] Non-participatory due to:     condition    Clinical Pain Assessment (nonverbal scale for severity on nonverbal patients):   Clinical Pain Assessment  Severity: 0          Duration: for how long has pt been experiencing pain (e.g., 2 days, 1 month, years)  Frequency: how often pain is an issue (e.g., several times per day, once every few days, constant)     FUNCTIONAL ASSESSMENT:     Palliative Performance Scale (PPS):  PPS: 10       PSYCHOSOCIAL/SPIRITUAL SCREENING:     Palliative IDT has assessed this patient for cultural preferences / practices and a referral made as appropriate to needs (Cultural Services, Patient Advocacy, Ethics, etc.)    Any spiritual / Mosque concerns:  [] Yes /  [x] No    Caregiver Burnout:  [] Yes /  [x] No /  [] No Caregiver Present      Anticipatory grief assessment:   [x] Normal  / [] Maladaptive       ESAS Anxiety: Anxiety: 0    ESAS Depression: Depression: 0        REVIEW OF SYSTEMS:     Positive and pertinent negative findings in ROS are noted above in HPI. The following systems were [x] reviewed / [] unable to be reviewed as noted in HPI  Other findings are noted below. Systems: constitutional, ears/nose/mouth/throat, respiratory, gastrointestinal, genitourinary, musculoskeletal, integumentary, neurologic, psychiatric, endocrine. Positive findings noted below. Modified ESAS Completed by: provider   Fatigue: 10 Drowsiness: 0   Depression: 0 Pain: 0   Anxiety: 0 Nausea: 0     Dyspnea: 0           Stool Occurrence(s): 1        PHYSICAL EXAM:     From RN flowsheet:  Wt Readings from Last 3 Encounters:   06/23/21 123 lb 10.9 oz (56.1 kg)   02/10/21 103 lb 9.9 oz (47 kg)   02/24/20 105 lb 6.1 oz (47.8 kg)     Blood pressure 103/73, pulse (!) 108, temperature (!) 101 °F (38.3 °C), resp. rate 28, height 5' 5\" (1.651 m), weight 123 lb 10.9 oz (56.1 kg), SpO2 94 %.     Pain Scale 1: Behavioral Pain Scale (BPS)                    Last bowel movement, if known:     Constitutional: obtunded, not on sedation  Respiratory: on the vent     HISTORY:     Active Problems:    Cardiac arrest ([facility] [address] (6/23/2021)      Need for emotional support ()      DNR (do not resuscitate) discussion ()      Obtunded ()      Anoxic brain injury ([facility] [address] ()      Past Medical History:   Diagnosis Date    Anxiety     Depression     Gyn exam     Dr Lanney Peabody Headache(784.0)     Panic attack     Postpartum hemorrhage 1/2010    Psychiatric disorder     depression    Sleep trouble     Tobacco use disorder       Past Surgical History:   Procedure Laterality Date    DILATION AND CURETTAGE  2003, 1/2010    HX GYN      D+C x 2    HX HEENT      tonsillectomy    HX PACEMAKER  09/2013    HX TUBAL LIGATION  4-2010      Family History   Problem Relation Age of Onset    Cancer Maternal Grandmother     Diabetes Maternal Grandmother  Heart Disease Maternal Grandmother     Hypertension Maternal Grandmother     Hypertension Mother       History reviewed, no pertinent family history.   Social History     Tobacco Use    Smoking status: Current Every Day Smoker     Packs/day: 0.50     Years: 10.00     Pack years: 5.00     Types: Cigarettes    Smokeless tobacco: Never Used   Substance Use Topics    Alcohol use: No     Comment: patient states she quit     No Known Allergies   Current Facility-Administered Medications   Medication Dose Route Frequency    balsam peru-castor oiL (VENELEX) ointment   Topical BID    sodium chloride (NS) flush 5-40 mL  5-40 mL IntraVENous Q8H    sodium chloride (NS) flush 5-40 mL  5-40 mL IntraVENous PRN    acetaminophen (TYLENOL) tablet 650 mg  650 mg Oral Q6H PRN    Or    acetaminophen (TYLENOL) suppository 650 mg  650 mg Rectal Q6H PRN    polyethylene glycol (MIRALAX) packet 17 g  17 g Oral DAILY PRN    ondansetron (ZOFRAN ODT) tablet 4 mg  4 mg Oral Q8H PRN    Or    ondansetron (ZOFRAN) injection 4 mg  4 mg IntraVENous Q6H PRN    heparin (porcine) injection 5,000 Units  5,000 Units SubCUTAneous Q8H    chlorhexidine (ORAL CARE KIT) 0.12 % mouthwash 15 mL  15 mL Oral Q12H    insulin lispro (HUMALOG) injection   SubCUTAneous Q6H    glucose chewable tablet 16 g  4 Tablet Oral PRN    dextrose (D50W) injection syrg 12.5-25 g  12.5-25 g IntraVENous PRN    glucagon (GLUCAGEN) injection 1 mg  1 mg IntraMUSCular PRN    piperacillin-tazobactam (ZOSYN) 3.375 g in 0.9% sodium chloride (MBP/ADV) 100 mL MBP  3.375 g IntraVENous Q8H    amiodarone (CORDARONE) 375 mg/250 mL D5W infusion  1 mg/min IntraVENous TITRATE    hydrocortisone Sod Succ (PF) (SOLU-CORTEF) injection 50 mg  50 mg IntraVENous Q6H    famotidine (PF) (PEPCID) 20 mg in 0.9% sodium chloride 10 mL injection  20 mg IntraVENous Q12H    alcohol 62% (NOZIN) nasal  1 Ampule  1 Ampule Topical Q12H    vancomycin (VANCOCIN) 1,000 mg in 0.9% sodium chloride 250 mL (VIAL-MATE)  1,000 mg IntraVENous Q18H    dextrose (D50W) injection syrg 12.5-25 g  12.5-25 g IntraVENous PRN    NOREPINephrine (LEVOPHED) 32 mg in 5% dextrose 250 mL infusion  0.5-100 mcg/min IntraVENous TITRATE    EPINEPHrine (ADRENALIN) 10 mg in 0.9% sodium chloride 250 mL infusion  0-10 mcg/min IntraVENous TITRATE    PHENYLephrine (LYNNETTE-SYNEPHRINE) 30 mg in 0.9% sodium chloride 250 mL infusion   mcg/min IntraVENous TITRATE          LAB AND IMAGING FINDINGS:     Lab Results   Component Value Date/Time    WBC 31.0 (H) 06/24/2021 02:55 AM    HGB 14.5 06/24/2021 02:55 AM    PLATELET 424 40/60/4439 02:55 AM     Lab Results   Component Value Date/Time    Sodium 147 (H) 06/24/2021 02:55 AM    Potassium 2.8 (L) 06/24/2021 02:55 AM    Chloride 111 (H) 06/24/2021 02:55 AM    CO2 27 06/24/2021 02:55 AM    BUN 22 (H) 06/24/2021 02:55 AM    Creatinine 1.79 (H) 06/24/2021 02:55 AM    Calcium 8.5 06/24/2021 02:55 AM    Magnesium 2.3 06/24/2021 02:55 AM    Phosphorus 1.4 (L) 06/23/2021 11:15 AM      Lab Results   Component Value Date/Time    Alk.  phosphatase 97 06/24/2021 02:55 AM    Protein, total 5.0 (L) 06/24/2021 02:55 AM    Albumin 2.2 (L) 06/24/2021 02:55 AM    Globulin 2.8 06/24/2021 02:55 AM     Lab Results   Component Value Date/Time    INR 1.7 (H) 06/23/2021 11:15 AM    Prothrombin time 17.2 (H) 06/23/2021 11:15 AM    aPTT 27.0 12/13/2010 10:13 PM      No results found for: IRON, FE, TIBC, IBCT, PSAT, FERR   Lab Results   Component Value Date/Time    pH 7.41 06/23/2021 09:58 AM    PCO2 42 06/23/2021 09:58 AM    PO2 86 06/23/2021 09:58 AM     No components found for: [place]   Lab Results   Component Value Date/Time    CK 43 05/12/2015 09:21 PM    CK - MB <0.5 (L) 05/12/2015 09:21 PM                Total time:   Counseling / coordination time, spent as noted above:   > 50% counseling / coordination?:     Prolonged service was provided for  []30 min   []75 min in face to face time in the presence of the patient, spent as noted above. Time Start:   Time End:   Note: this can only be billed with 71349 (initial) or 25234 (follow up). If multiple start / stop times, list each separately.

## 2021-06-24 NOTE — PROGRESS NOTES
0730  Bedside report received. Pt's alexia' left and will return at 0800. Assessment complete. Pt has no reflexes present. Pupils are fixed and dilated. Pt has no palpable pulses and only able to doppler radial and femoral pulses. 0805 Pt turned and skin assessment complete. Pt has 2 small areas that appear to be abrasions. One on sacrum and under L scapula. 0830  Pt's alexia' and sister at bedside. Woo asked if pt was brain dead. Discussed brain death testing and informed him that it had not been done yet. Also informed him what neuro assessment revealed. [facility] on unit and spoke with family. Dr. Kobe García on unit. Spoke with him and asked him to review EEG since it had not been read. He reviewed EEG and assessed pt. Assessment revealed pt with no reflexes present. 0930  According to woo, pt's daughter is coming to hospital  1030  Family at bedside including pt's daughter. Dr. Eryn Weaver came to bedside and placed pt on spontaneous. Pt not taking any spontaneous breaths. Discussed with family that we will proceed with apnea test for brain death  1100  ABG drawn with difficulty. Due to ABG results, pt deemed too unstable for apnea test.  Pt to have Cerebral perfusion scan. 36  Spoke with CT and they requested MD speak to radiologist.  Dr. Eryn Weaver notified. 1200  Spoke with Javier in CT. Test to be done by nuclear med. [id number] [facility] with Phan Mason in nuc med. Test scheduled for 1300.  1240  Preparing pt for transport to nuclear med. BP not reading. Osmar restarted. [facility] team and Dr. Eryn Weaver made aware. [id number]  Returned from [employer]. Osmar titrated up and BP still having difficulty registering. Epi and osmar increased and Dr. Eryn Weaver made aware.   Orders received for 1L LR bolus and vaso gtt

## 2021-06-24 NOTE — PROGRESS NOTES
Care Management:    Patient admitted with cardiac rest and on vent and critical in the ICU. [facility] on premises. Palliative working with family. Chart has been reviewed and we will cont to follow as appropriate.     Marianna Oh RN ACM

## 2021-06-25 NOTE — PROGRESS NOTES
1600  BP stable after LR bolus. Vaso not started. Daughter at bedside and Dr. Vivian Mackey spoke with her to give her results of cerebral scan. [facility] also updated daughter on plan to take pt to OR around 0100.   1630  Pt discharged and readmitted for organ donation. Orders received from Re[facility] for art line and Dr. Vivian Mackey aware. Supplies gathered and placed at bedside  1750  Dr. Vivian Mackey at bedside to place art line. Family sent to waiting room. 1840  Multiple attempts made to place art line, even with ultrasound unsuccessful. Called anesthesia to assist.  1900  Dr. Surya Lawson at bedside and able to place right radial art line. Labs drawn and sent.

## 2021-06-25 NOTE — ANESTHESIA POSTPROCEDURE EVALUATION
Brain dead organ donor  Procedure(s):  ORGAN HARVEST MULTIPLE. general    <BSHSIANPOST>    INITIAL Post-op Vital signs: No vitals data found for the desired time range.

## 2021-06-25 NOTE — PROCEDURES
[address]     Electroencephalogram Report    Date: 06/23/20201    Patient Name: Jose Wilson  YOB: 1975   Medical Record No: [id number]    Procedure ID: [id number]  Procedure Type: Routine    INDICATION: COma    STATE: Intubated comatose    IMPRESSION:  Continuous alpha activity no reactivity  Absence of seizures on this study does not exclude epilepsy. Clinical correlation is advised. DESCRIPTION OF PROCEDURE: Electrodes were applied in accordance with the international 10-20 system of electrode placement. EEG was reviewed in both bipolar and referential montages. DESCRIPTION OF FINDINGS: Background consists of symmetric alpha activity. With suctioning there is no reactivity. With photic stimulation there is no significant change.       Medications:  Current Facility-Administered Medications   Medication Dose Route Frequency    dextrose 5% infusion  75 mL/hr IntraVENous CONTINUOUS    EPINEPHrine (ADRENALIN) 5 mg in 0.9% sodium chloride 250 mL infusion  0-15 mcg/min IntraVENous TITRATE    levothyroxine (SYNTHROID) 200 mcg in 0.9% sodium chloride 250 mL infusion  20 mcg/hr IntraVENous TITRATE    NOREPINephrine (LEVOPHED) 8 mg in 5% dextrose 250mL (32 mcg/mL) infusion  0-100 mcg/min IntraVENous TITRATE    vasopressin (VASOSTRICT) 20 Units in 0.9% sodium chloride 100 mL infusion  0-0.04 Units/min IntraVENous TITRATE    piperacillin-tazobactam (ZOSYN) 3.375 g in 0.9% sodium chloride (MBP/ADV) 100 mL MBP  3.375 g IntraVENous Q8H    potassium chloride 20 mEq in 50 ml IVPB  20 mEq IntraVENous PRN    amiodarone (CORDARONE) 375 mg/250 mL D5W infusion  0.5-1 mg/min IntraVENous TITRATE    PHENYLephrine (LYNNETTE-SYNEPHRINE) 30 mg in 0.9% sodium chloride 250 mL infusion   mcg/min IntraVENous TITRATE    methylprednisoLONE (Solu-MEDROL) 10 MG/ML INFUSION MBP  100 mg/hr IntraVENous CONTINUOUS    EPINEPHrine (ADRENALIN) 10 mg in 0.9% sodium chloride 250 mL infusion  0-15 mcg/min IntraVENous TITRATE    NOREPINephrine (LEVOPHED) 32 mg in 5% dextrose 250 mL infusion  0-100 mcg/min IntraVENous TITRATE     Current Outpatient Medications   Medication Sig    potassium chloride SR (KLOR-CON 10) 10 mEq tablet Take 2 Tabs by mouth daily.  meclizine (ANTIVERT) 25 mg tablet Take 1 Tab by mouth three (3) times daily as needed for Dizziness.  methocarbamol (ROBAXIN-750) 750 mg tablet Take 1 Tab by mouth four (4) times daily. Indications: muscle spasm    dextromethorphan-guaiFENesin (ROBITUSSIN COUGH-CHEST EUSEBIO DM) 5-100 mg/5 mL liqd Take 10 mL by mouth every six (6) hours.  fluticasone propionate (FLONASE) 50 mcg/actuation nasal spray 2 Sprays by Both Nostrils route daily.  ondansetron (ZOFRAN ODT) 4 mg disintegrating tablet Take 1 Tab by mouth every eight (8) hours as needed for Nausea.  lidocaine (URO-JET) 2 % jelp jelly Apply to affected area three times daily as needed.  traMADol (ULTRAM) 50 mg tablet Take 1 Tab by mouth every six (6) hours as needed for Pain. Max Daily Amount: 200 mg.  Indications: Pain

## 2021-06-25 NOTE — PROGRESS NOTES
19: 30-Bedside and Verbal shift change report given to KRAIG Ferrara (oncoming nurse) by Ramy Galarza (offgoing nurse). Report included the following information SBAR, Kardex, Intake/Output, MAR, Accordion, Med Rec Status and Cardiac Rhythm Paced. 19:45-CHG bath done, linens changed, and   20:00-Family @bedside, Soy& his mother, pt'[facility]. [facility] coordinator talking to family about process. 20:15-19:45- Focused assessment completed, mouth and aguila care done. Pt is intubated, not sedated, but unresponsive to pain, no corneal, cough, or gag, Atrial Paced on tele, BP supported by pressors, 7.5 ETT [id number](90%), NG to right Nare to low intermittent suction & flexiseal w/loose,watery, foul smelling stool, aguila, PIV(x2), RIJ CVC(x4) w/ [email or web address], Levo@100mcgs, Epi@15mcgs, Osmar@200, [email or web address], & [email or web address] . Naomi and alexia's mother @bedside. [facility] in POD, and have approached pt family. 21:30-Per organ procurement team, Q6 labs drawn and sent to hospital lab, & various tubes collected for team to sent off related to procurement needs. 23:00-Called Nursing Supervisor to confirm honor walk time. Confirmed w/procurement team, OR time still tentative for 01:00.  00:00-Prepping pt room, to go down to OR.  00:30-Daughter@ bedside saying goodbye to pt.  01:00-Pt leaving room to be transported to OR, pt transported through honor walk.  01:15-Pt in surgical department.

## 2021-06-25 NOTE — PROGRESS NOTES
19: 30-Bedside and Verbal shift change report given to KRAIG Ferrara (oncoming nurse) by Sharri Garcia (offgoing nurse). Report included the following information SBAR, Kardex, Intake/Output, MAR, Accordion, Med Rec Status and Cardiac Rhythm Paced. 19:45-CHG bath done, linens changed, and   20:00-Family @bedside, Soy& his mother, pt'[facility]. [facility] coordinator talking to family about process. 20:15-19:45- Focused assessment completed, mouth and aguila care done. Pt is intubated, not sedated, but unresponsive to pain, no corneal, cough, or gag, Atrial Paced on tele, BP supported by pressors, 7.5 ETT [id number](90%), NG to right Nare to low intermittent suction & flexiseal w/loose,watery, foul smelling stool, aguila, PIV(x2), RIJ CVC(x4) w/ [email or web address], Levo@100mcgs, Epi@15mcgs, Osmar@200, [email or web address], & [email or web address] . Naomi and alexia's mother @bedside. [place] in POD, and have approached pt family. 21:30-Per organ procurement team, Q6 labs drawn and sent to hospital lab, & various tubes collected for team to sent off related to procurement needs.

## 2021-06-25 NOTE — ANESTHESIA PREPROCEDURE EVALUATION
Relevant Problems   No relevant active problems       Anesthetic History   No history of anesthetic complications            Review of Systems / Medical History  Patient summary reviewed, nursing notes reviewed and pertinent labs reviewed    Pulmonary          Smoker         Neuro/Psych         Headaches and psychiatric history     Cardiovascular            Dysrhythmias   Pacemaker    Exercise tolerance: >4 METS  Comments: Arrest at home-anoxic brain death-organ donor   GI/Hepatic/Renal  Within defined limits              Endo/Other  Within defined limits           Other Findings              Physical Exam    Airway          Intubated   Cardiovascular  Regular rate and rhythm,  S1 and S2 normal,  no murmur, click, rub, or gallop             Dental         Pulmonary  Breath sounds clear to auscultation               Abdominal  GI exam deferred       Other Findings            Anesthetic Plan    ASA: 6  Anesthesia type: general                Brain dead organ donor

## 2021-06-28 LAB
BACTERIA SPEC CULT: NORMAL
SERVICE CMNT-IMP: NORMAL

## 2021-06-30 LAB
BACTERIA SPEC CULT: NORMAL
BACTERIA SPEC CULT: NORMAL
SERVICE CMNT-IMP: NORMAL
SERVICE CMNT-IMP: NORMAL
